# Patient Record
Sex: FEMALE | Race: WHITE | Employment: OTHER | URBAN - METROPOLITAN AREA
[De-identification: names, ages, dates, MRNs, and addresses within clinical notes are randomized per-mention and may not be internally consistent; named-entity substitution may affect disease eponyms.]

---

## 2017-02-17 ENCOUNTER — TELEPHONE (OUTPATIENT)
Dept: GASTROENTEROLOGY | Facility: CLINIC | Age: 77
End: 2017-02-17

## 2017-02-17 NOTE — TELEPHONE ENCOUNTER
Mailed letter to patient notifying him of overdue labs (CBC) Ordered 8-9-16 and due 1-2017. Overdue letter mailed to patient.

## 2017-02-23 ENCOUNTER — APPOINTMENT (OUTPATIENT)
Dept: LAB | Facility: HOSPITAL | Age: 77
End: 2017-02-23
Attending: INTERNAL MEDICINE
Payer: MEDICARE

## 2017-02-23 PROCEDURE — 85025 COMPLETE CBC W/AUTO DIFF WBC: CPT | Performed by: INTERNAL MEDICINE

## 2017-03-03 DIAGNOSIS — D50.0 IRON DEFICIENCY ANEMIA DUE TO CHRONIC BLOOD LOSS: Primary | ICD-10-CM

## 2017-03-28 ENCOUNTER — TELEPHONE (OUTPATIENT)
Dept: GASTROENTEROLOGY | Facility: CLINIC | Age: 77
End: 2017-03-28

## 2017-03-28 NOTE — TELEPHONE ENCOUNTER
Pt is not due for repeat labs at this time. Last labs 2/23/17 due for repeat labs in 3 moths which would be May. LMTCB with any additional questions.

## 2017-03-28 NOTE — TELEPHONE ENCOUNTER
Pt wanted to know if she owed any other labs for GS. States she had last labs done on 2/27/2017 - does she go every 3 mos? Pls call. Thank you.

## 2017-05-23 ENCOUNTER — TELEPHONE (OUTPATIENT)
Dept: GASTROENTEROLOGY | Facility: CLINIC | Age: 77
End: 2017-05-23

## 2017-05-23 NOTE — TELEPHONE ENCOUNTER
Pt.,wants to make sure that orders are entered for her to get her blood work up done. Pt. States that she is due to get her blood work done on 5/27.

## 2017-05-23 NOTE — TELEPHONE ENCOUNTER
Dr. Genna Rodriguez see below. I see future orders for CBC, Ferritin, Iron. Would you like any others placed before pt has blood draw?

## 2017-05-25 ENCOUNTER — TELEPHONE (OUTPATIENT)
Dept: GASTROENTEROLOGY | Facility: CLINIC | Age: 77
End: 2017-05-25

## 2017-05-25 NOTE — TELEPHONE ENCOUNTER
Spoke with pt and reviewed labs that we ordered. She is upset that when she went to her well-visit with PCP she did not check everything. She is asking if GS will order a lipid panel so she can check her cholesterol.   Reviewed I would check with GS and

## 2017-06-13 ENCOUNTER — APPOINTMENT (OUTPATIENT)
Dept: LAB | Facility: HOSPITAL | Age: 77
End: 2017-06-13
Attending: INTERNAL MEDICINE
Payer: MEDICARE

## 2017-06-13 DIAGNOSIS — D50.0 IRON DEFICIENCY ANEMIA DUE TO CHRONIC BLOOD LOSS: ICD-10-CM

## 2017-06-13 PROCEDURE — 83540 ASSAY OF IRON: CPT

## 2017-06-13 PROCEDURE — 36415 COLL VENOUS BLD VENIPUNCTURE: CPT

## 2017-06-13 PROCEDURE — 85025 COMPLETE CBC W/AUTO DIFF WBC: CPT

## 2017-06-13 PROCEDURE — 82728 ASSAY OF FERRITIN: CPT

## 2017-06-15 ENCOUNTER — TELEPHONE (OUTPATIENT)
Dept: GASTROENTEROLOGY | Facility: CLINIC | Age: 77
End: 2017-06-15

## 2017-06-15 DIAGNOSIS — D50.0 IRON DEFICIENCY ANEMIA DUE TO CHRONIC BLOOD LOSS: Primary | ICD-10-CM

## 2017-08-28 RX ORDER — PANTOPRAZOLE SODIUM 40 MG/1
TABLET, DELAYED RELEASE ORAL
Qty: 60 TABLET | Refills: 2 | Status: SHIPPED | OUTPATIENT
Start: 2017-08-28 | End: 2017-12-14

## 2017-08-28 NOTE — TELEPHONE ENCOUNTER
Pending Prescriptions Disp Refills    PANTOPRAZOLE SODIUM 40 MG Oral Tab EC [Pharmacy Med Name: PANTOPRAZOLE 40MG TABLETS] 60 tablet 0     Sig: TAKE 1 TABLET(40 MG) BY MOUTH TWICE DAILY BEFORE MEALS         See refill request  Patient last seen 3-31-16.

## 2017-08-28 NOTE — TELEPHONE ENCOUNTER
Please contact the patient. I have refilled her prescription for 3 months. She should be seen in the office by the end of the year.

## 2017-08-30 ENCOUNTER — TELEPHONE (OUTPATIENT)
Dept: GASTROENTEROLOGY | Facility: CLINIC | Age: 77
End: 2017-08-30

## 2017-08-30 NOTE — TELEPHONE ENCOUNTER
There is no scientific evidence that \"a detox\" or \"colon cleanse\" provides any health benefits. Agree with either a trial of a FODMAP diet or a trial of a probiotic such as Align.

## 2017-08-30 NOTE — TELEPHONE ENCOUNTER
Future Appointments  Date Time Provider Wei Rachel   9/1/2017 2:00 PM Umesh Singleton MD [facility] 9208   12/14/2017 2:00 PM Magui Lopez MD [facility] [place] [facility]     appt made

## 2017-08-30 NOTE — TELEPHONE ENCOUNTER
Pt called to find out when her next labs are due. She also has questions about report she heard on news about bacteria in stomach and melatonin. Please call.

## 2017-08-30 NOTE — TELEPHONE ENCOUNTER
Spoke with pt at length    Watched something online regarding bacteria in lower intestine and wants to \"detox or cleanse her gut. \"    Symptoms lately of looser stools and bloating    I advised usually we do not advise detox or cleanses you can buy OTC.

## 2017-08-31 NOTE — TELEPHONE ENCOUNTER
Pt called me back and below recommendations were reviewed. I faxed her a copy of the low FODMAP diet today.  Reviewed these recommendations can take weeks to notice effect

## 2017-12-13 ENCOUNTER — LAB ENCOUNTER (OUTPATIENT)
Dept: LAB | Facility: HOSPITAL | Age: 77
End: 2017-12-13
Attending: INTERNAL MEDICINE
Payer: MEDICARE

## 2017-12-13 DIAGNOSIS — D50.0 IRON DEFICIENCY ANEMIA DUE TO CHRONIC BLOOD LOSS: ICD-10-CM

## 2017-12-13 PROCEDURE — 85025 COMPLETE CBC W/AUTO DIFF WBC: CPT

## 2017-12-13 PROCEDURE — 82728 ASSAY OF FERRITIN: CPT

## 2017-12-13 PROCEDURE — 36415 COLL VENOUS BLD VENIPUNCTURE: CPT

## 2017-12-13 PROCEDURE — 83540 ASSAY OF IRON: CPT

## 2017-12-14 ENCOUNTER — TELEPHONE (OUTPATIENT)
Dept: GASTROENTEROLOGY | Facility: CLINIC | Age: 77
End: 2017-12-14

## 2017-12-14 ENCOUNTER — OFFICE VISIT (OUTPATIENT)
Dept: GASTROENTEROLOGY | Facility: CLINIC | Age: 77
End: 2017-12-14

## 2017-12-14 VITALS
SYSTOLIC BLOOD PRESSURE: 130 MMHG | DIASTOLIC BLOOD PRESSURE: 84 MMHG | WEIGHT: 176.5 LBS | BODY MASS INDEX: 30.13 KG/M2 | HEIGHT: 64 IN

## 2017-12-14 DIAGNOSIS — K44.9 HIATAL HERNIA: ICD-10-CM

## 2017-12-14 DIAGNOSIS — E61.1 IRON DEFICIENCY: Primary | ICD-10-CM

## 2017-12-14 PROCEDURE — 99213 OFFICE O/P EST LOW 20 MIN: CPT | Performed by: INTERNAL MEDICINE

## 2017-12-14 PROCEDURE — G0463 HOSPITAL OUTPT CLINIC VISIT: HCPCS | Performed by: INTERNAL MEDICINE

## 2017-12-14 RX ORDER — PANTOPRAZOLE SODIUM 40 MG/1
40 TABLET, DELAYED RELEASE ORAL
Qty: 60 TABLET | Refills: 11 | Status: SHIPPED | OUTPATIENT
Start: 2017-12-14 | End: 2018-12-26

## 2017-12-14 NOTE — TELEPHONE ENCOUNTER
I spoke with pt. She states somestimes she takes 2 pills daily and other times she only takes one daily.     She states depends on pain.  -when she takes Tylenol, she'll only take 1 daily  -when her pain is more severe, she will take ibuprofen and then in

## 2017-12-14 NOTE — PATIENT INSTRUCTIONS
1.  Resume iron once daily. 2.  Repeat blood count in 6 months. 3.  Options to regulate the bowel movements: Align 1 daily for 30 days. An alternative would be a fiber supplement such as Benefiber 1 tablespoon daily in 8 ounces of water.   [address]

## 2017-12-18 ENCOUNTER — TELEPHONE (OUTPATIENT)
Dept: GASTROENTEROLOGY | Facility: CLINIC | Age: 77
End: 2017-12-18

## 2017-12-18 RX ORDER — VITAMIN E 200 UNIT
CAPSULE ORAL
COMMUNITY
End: 2019-02-02

## 2017-12-18 NOTE — TELEPHONE ENCOUNTER
Pt requesting a call back to update RN on medications she is currently taking.  Please call thank you [phone]

## 2017-12-18 NOTE — TELEPHONE ENCOUNTER
I phoned pt and reviewed.   Also notes she takes megared omega pills and [facility] ultramega womens dietary supplement

## 2017-12-18 NOTE — TELEPHONE ENCOUNTER
Albuterol/Ventolin by Dr Dayanara Comer.  Never started  Anastrozole - BC stopped off 6 months    Stopped Cymbalta 30 or 60 mg    Ferrous Sulfate [id number]  Correct dosing  She is taking the correct dosing    LMTCB

## 2017-12-29 RX ORDER — PANTOPRAZOLE SODIUM 40 MG/1
TABLET, DELAYED RELEASE ORAL
Qty: 180 TABLET | Refills: 4 | Status: SHIPPED | OUTPATIENT
Start: 2017-12-29 | End: 2019-01-22

## 2017-12-29 NOTE — TELEPHONE ENCOUNTER
See above refill request. Last seen in office 12/14/17. Last refilled 12/14/17 #60 x11 refills to [facility].     Resent with 90 day supply today

## 2018-03-16 NOTE — TELEPHONE ENCOUNTER
Current Outpatient Prescriptions:  PANTOPRAZOLE SODIUM 40 MG Oral Tab EC TAKE 1 TABLET(40 MG) BY MOUTH TWICE DAILY BEFORE MEALS Disp: 180 tablet Rfl: 4

## 2018-03-16 NOTE — TELEPHONE ENCOUNTER
Pending Prescriptions Disp Refills    Pantoprazole Sodium 40 MG Oral Tab EC 60 tablet 11     Sig: Take 1 tablet (40 mg total) by mouth 2 (two) times daily before meals. See refill request  Patient last seen 12-14-17.    Medication last refilled 12

## 2018-03-19 RX ORDER — PANTOPRAZOLE SODIUM 40 MG/1
40 TABLET, DELAYED RELEASE ORAL
Qty: 60 TABLET | Refills: 11 | OUTPATIENT
Start: 2018-03-19

## 2018-03-19 NOTE — TELEPHONE ENCOUNTER
Rx is refilled for 90 days ×1 year during the last office visit. Patient should still have refills. Rx declined.

## 2018-04-20 ENCOUNTER — TELEPHONE (OUTPATIENT)
Dept: GASTROENTEROLOGY | Facility: CLINIC | Age: 78
End: 2018-04-20

## 2018-04-20 NOTE — TELEPHONE ENCOUNTER
Pt is wondering if she is due for labs for dr dang informed pt orders were put in wanted to hear from clinical staff  please call.

## 2018-04-26 NOTE — TELEPHONE ENCOUNTER
Nursing: Typically labs are not related to a gastroenterology issue are ordered by the patient's primary care or the appropriate specialty. I do not see that the lipid panel is related to any of her gastro-issues that she has been seen Dr. Esther Campos.

## 2018-04-26 NOTE — TELEPHONE ENCOUNTER
Left detailed message informing of three labs that are due in June per Dr. Yeimy Gill on 12/14/17. Informed message would be routed to N since Dr. Agus Patton is out of office regarding placing Lipid panel order. Office phone number provided if any further questions.

## 2018-04-26 NOTE — TELEPHONE ENCOUNTER
Pt returning RN call please call back regarding orders. Pt states to please leave a detailed message with orders that the pt must complete and when. Should pt fast for labs. Pt also requesting lab order for cholesterol levels.   Ph. [phone]

## 2018-04-26 NOTE — TELEPHONE ENCOUNTER
Left detailed message relaying Tete EL's message regarding Lipid panel as shown below. Office number provided to pt again if any additional questions at this time. No further questions at this time.

## 2018-05-31 ENCOUNTER — LAB ENCOUNTER (OUTPATIENT)
Dept: LAB | Facility: HOSPITAL | Age: 78
End: 2018-05-31
Attending: INTERNAL MEDICINE
Payer: MEDICARE

## 2018-05-31 DIAGNOSIS — E61.1 IRON DEFICIENCY: ICD-10-CM

## 2018-05-31 PROCEDURE — 36415 COLL VENOUS BLD VENIPUNCTURE: CPT

## 2018-05-31 PROCEDURE — 82728 ASSAY OF FERRITIN: CPT

## 2018-05-31 PROCEDURE — 85025 COMPLETE CBC W/AUTO DIFF WBC: CPT

## 2018-05-31 PROCEDURE — 84466 ASSAY OF TRANSFERRIN: CPT

## 2018-05-31 PROCEDURE — 83540 ASSAY OF IRON: CPT

## 2018-06-01 ENCOUNTER — TELEPHONE (OUTPATIENT)
Dept: GASTROENTEROLOGY | Facility: CLINIC | Age: 78
End: 2018-06-01

## 2018-06-01 NOTE — TELEPHONE ENCOUNTER
Spoke to pt and relayed Dr. Dayami Mallory message as shown below. Pt verbalized understanding appt scheduled for 12/3/18 at 1500. Pt verbalized understanding agreement to appointment scheduled. No further action required at this time.     Notes recorded by S

## 2018-12-26 ENCOUNTER — TELEPHONE (OUTPATIENT)
Dept: GASTROENTEROLOGY | Facility: CLINIC | Age: 78
End: 2018-12-26

## 2018-12-26 DIAGNOSIS — E61.1 IRON DEFICIENCY: Primary | ICD-10-CM

## 2018-12-27 NOTE — TELEPHONE ENCOUNTER
----- Message from Ang Rodriguez MD sent at 6/15/2017  2:45 PM CDT -----  Please inform the patient that her blood count remains normal off the iron. Iron level is normal as well. I would repeat a CBC and iron studies in 6 months.   The patient susie
LMTCB
LMTCB
Pt notified of results and recommendations. Future lab orders placed and pt transferred to scheduling for appt. Verbalizes understanding and denies questions at this time.
no

## 2018-12-27 NOTE — TELEPHONE ENCOUNTER
Requested Prescriptions     Pending Prescriptions Disp Refills   • PANTOPRAZOLE SODIUM 40 MG Oral Tab EC [Pharmacy Med Name: PANTOPRAZOLE 40MG TABLETS] 60 tablet 0     Sig: TAKE 1 TABLET BY MOUTH TWICE DAILY BEFORE A MEAL     LOV 12/14/17  LR 12/29/17    e

## 2018-12-28 ENCOUNTER — TELEPHONE (OUTPATIENT)
Dept: GASTROENTEROLOGY | Facility: CLINIC | Age: 78
End: 2018-12-28

## 2018-12-28 RX ORDER — PANTOPRAZOLE SODIUM 40 MG/1
TABLET, DELAYED RELEASE ORAL
Qty: 60 TABLET | Refills: 2 | Status: SHIPPED | OUTPATIENT
Start: 2018-12-28 | End: 2019-01-22

## 2018-12-28 NOTE — TELEPHONE ENCOUNTER
Pt asked about rx:Pantoprazole,pt advised of message below, pt ryder first available appt 2/4/19, thanks.

## 2018-12-28 NOTE — TELEPHONE ENCOUNTER
Future Appointments   Date Time Provider Wei Ramos   1/10/2019 11:00 AM STEPHEN Christy [facility]- Patient is asking for labs orders to be placed that are due in Dec 2018 per Dr. Agus Patton last lab result note.   -- please sig

## 2018-12-28 NOTE — TELEPHONE ENCOUNTER
GI RN Staff:  I have refilled the patient's Rx x 3 months. She should be seen in the office in follow up for further refills.

## 2018-12-31 NOTE — TELEPHONE ENCOUNTER
Left message RX was sent to her local [facility] to call back if she had further Marlyn Stroud [phone].

## 2019-01-14 NOTE — PROGRESS NOTES
[address] [facility] Follow-up Visit    Natalie EGD/colonoscopy performed by Dr. Allen Suarez with IV twilight related to DARRYN, chronic NSAID use, findings: Left colon diverticulosis, esophageal junction erosions, moderate sized hiatal hernia with Jace's erosions, antral erosions likely NSAID induced,  MG Oral Cap Take by mouth. Disp:  Rfl:    ferrous sulfate 325 (65 FE) MG Oral Tab EC Take 325 mg by mouth daily.    Disp:  Rfl:        Allergies:  No Known Allergies    ROS:   CONSTITUTIONAL:  negative for fevers, chills  EYES:  negative for change well.  GERD symptoms are well managed with Protonix 40 mg daily which I have strongly encouraged her to continue. X 1 year. She may use NSAIDs cautiously at the lowest dose possible and as needed. I would avoid large quantities of NSAIDs.   I reviewed he

## 2019-01-21 ENCOUNTER — APPOINTMENT (OUTPATIENT)
Dept: LAB | Facility: HOSPITAL | Age: 79
End: 2019-01-21
Attending: INTERNAL MEDICINE
Payer: MEDICARE

## 2019-01-21 LAB
BASOPHILS # BLD: 0 K/UL (ref 0–0.2)
BASOPHILS NFR BLD: 1 %
EOSINOPHIL # BLD: 0.1 K/UL (ref 0–0.7)
EOSINOPHIL NFR BLD: 1 %
ERYTHROCYTE [DISTWIDTH] IN BLOOD BY AUTOMATED COUNT: 16.7 % (ref 11–15)
FERRITIN SERPL IA-MCNC: 18 NG/ML (ref 11–307)
HCT VFR BLD AUTO: 42.1 % (ref 35–48)
HGB BLD-MCNC: 13.8 G/DL (ref 12–16)
IRON SATN MFR SERPL: 10 % (ref 15–50)
IRON SERPL-MCNC: 38 MCG/DL (ref 28–170)
LYMPHOCYTES # BLD: 1.3 K/UL (ref 1–4)
LYMPHOCYTES NFR BLD: 24 %
MCH RBC QN AUTO: 29.8 PG (ref 27–32)
MCHC RBC AUTO-ENTMCNC: 32.7 G/DL (ref 32–37)
MCV RBC AUTO: 91.1 FL (ref 80–100)
MONOCYTES # BLD: 0.6 K/UL (ref 0–1)
MONOCYTES NFR BLD: 12 %
NEUTROPHILS # BLD AUTO: 3.3 K/UL (ref 1.8–7.7)
NEUTROPHILS NFR BLD: 62 %
PLATELET # BLD AUTO: 311 K/UL (ref 140–400)
PMV BLD AUTO: 9.1 FL (ref 7.4–10.3)
RBC # BLD AUTO: 4.62 M/UL (ref 3.7–5.4)
TIBC SERPL-MCNC: 383 MCG/DL (ref 228–428)
TRANSFERRIN SERPL-MCNC: 290 MG/DL (ref 192–382)
WBC # BLD AUTO: 5.3 K/UL (ref 4–11)

## 2019-01-21 PROCEDURE — 83540 ASSAY OF IRON: CPT | Performed by: INTERNAL MEDICINE

## 2019-01-21 PROCEDURE — 84466 ASSAY OF TRANSFERRIN: CPT | Performed by: INTERNAL MEDICINE

## 2019-01-21 PROCEDURE — 82728 ASSAY OF FERRITIN: CPT | Performed by: INTERNAL MEDICINE

## 2019-01-21 PROCEDURE — 85025 COMPLETE CBC W/AUTO DIFF WBC: CPT | Performed by: INTERNAL MEDICINE

## 2019-01-21 PROCEDURE — 36415 COLL VENOUS BLD VENIPUNCTURE: CPT | Performed by: INTERNAL MEDICINE

## 2019-01-22 ENCOUNTER — TELEPHONE (OUTPATIENT)
Dept: GASTROENTEROLOGY | Facility: CLINIC | Age: 79
End: 2019-01-22

## 2019-01-22 ENCOUNTER — OFFICE VISIT (OUTPATIENT)
Dept: GASTROENTEROLOGY | Facility: CLINIC | Age: 79
End: 2019-01-22
Payer: MEDICARE

## 2019-01-22 VITALS
SYSTOLIC BLOOD PRESSURE: 144 MMHG | HEART RATE: 73 BPM | WEIGHT: 141.19 LBS | BODY MASS INDEX: 24.1 KG/M2 | HEIGHT: 64 IN | DIASTOLIC BLOOD PRESSURE: 88 MMHG

## 2019-01-22 DIAGNOSIS — D50.0 IRON DEFICIENCY ANEMIA DUE TO CHRONIC BLOOD LOSS: ICD-10-CM

## 2019-01-22 DIAGNOSIS — K44.9 HIATAL HERNIA WITH GERD: Primary | ICD-10-CM

## 2019-01-22 DIAGNOSIS — K21.9 HIATAL HERNIA WITH GERD: Primary | ICD-10-CM

## 2019-01-22 PROCEDURE — 99213 OFFICE O/P EST LOW 20 MIN: CPT | Performed by: NURSE PRACTITIONER

## 2019-01-22 PROCEDURE — G0463 HOSPITAL OUTPT CLINIC VISIT: HCPCS | Performed by: NURSE PRACTITIONER

## 2019-01-22 RX ORDER — PANTOPRAZOLE SODIUM 40 MG/1
40 TABLET, DELAYED RELEASE ORAL
Qty: 30 TABLET | Refills: 11 | Status: ON HOLD | OUTPATIENT
Start: 2019-01-22 | End: 2020-01-20

## 2019-01-22 NOTE — TELEPHONE ENCOUNTER
Notes recorded by STEPHEN Cornell on 1/22/2019 at 8:30 AM CST  Nursing: Please let the patient know that her blood work is consistent with her prior labs 7 months ago with the exception of a lower iron saturation that would indicate iron deficiency b

## 2019-01-22 NOTE — PATIENT INSTRUCTIONS
1. Continue Protonix   2. Start multivitamin with iron supplement  3. Complete lab work in 2 months  4.   Follow-up in 1 year or sooner if new issues arise

## 2019-01-23 ENCOUNTER — TELEPHONE (OUTPATIENT)
Dept: GASTROENTEROLOGY | Facility: CLINIC | Age: 79
End: 2019-01-23

## 2019-01-23 NOTE — TELEPHONE ENCOUNTER
Pt called to find out if this office has her blood type on file. Please call. OK to leave detailed message.

## 2019-01-23 NOTE — TELEPHONE ENCOUNTER
Notified patient via  that we do not have blood type on file as that is a different lab test that needs to be drawn. Also if the patient has any further questions she can call our office at [phone].

## 2019-02-01 ENCOUNTER — HOSPITAL ENCOUNTER (OUTPATIENT)
Dept: GENERAL RADIOLOGY | Facility: HOSPITAL | Age: 79
Discharge: HOME | End: 2019-02-01
Attending: ORTHOPAEDIC SURGERY
Payer: MEDICARE

## 2019-02-01 DIAGNOSIS — S69.92XA INJURY OF LEFT WRIST, INITIAL ENCOUNTER: ICD-10-CM

## 2019-02-01 PROCEDURE — 73110 X-RAY EXAM OF WRIST: CPT | Performed by: ORTHOPAEDIC SURGERY

## 2019-02-02 ENCOUNTER — HOSPITAL ENCOUNTER (OUTPATIENT)
Age: 79
Discharge: HOME | End: 2019-02-02
Attending: FAMILY MEDICINE
Payer: MEDICARE

## 2019-02-02 VITALS
RESPIRATION RATE: 18 BRPM | OXYGEN SATURATION: 100 % | WEIGHT: 141 LBS | DIASTOLIC BLOOD PRESSURE: 70 MMHG | HEART RATE: 74 BPM | TEMPERATURE: 98 F | HEIGHT: 64 IN | SYSTOLIC BLOOD PRESSURE: 127 MMHG | BODY MASS INDEX: 24.07 KG/M2

## 2019-02-02 DIAGNOSIS — M25.532 LEFT WRIST PAIN: Primary | ICD-10-CM

## 2019-02-02 DIAGNOSIS — M67.40 GANGLION CYST: ICD-10-CM

## 2019-02-02 PROCEDURE — 99204 OFFICE O/P NEW MOD 45 MIN: CPT

## 2019-02-02 PROCEDURE — 99213 OFFICE O/P EST LOW 20 MIN: CPT

## 2019-02-02 RX ORDER — ACETAMINOPHEN AND CODEINE PHOSPHATE 300; 30 MG/1; MG/1
1 TABLET ORAL NIGHTLY PRN
Qty: 7 TABLET | Refills: 0 | Status: SHIPPED | OUTPATIENT
Start: 2019-02-02 | End: 2019-02-09

## 2019-02-02 NOTE — ED INITIAL ASSESSMENT (HPI)
Pt presents to the IC with c/o wrist pain s/p a slip and fall. Pt called her ortho, Miguel A Laurent, and had an outpatient xray done yesterday. Resulted in the computer. Pt cannot get a hold of Miguel A Laurent to get her results.

## 2019-02-14 RX ORDER — PANTOPRAZOLE SODIUM 40 MG/1
TABLET, DELAYED RELEASE ORAL
Qty: 180 TABLET | Refills: 2 | Status: SHIPPED | OUTPATIENT
Start: 2019-02-14 | End: 2020-01-04

## 2019-02-14 NOTE — TELEPHONE ENCOUNTER
Requested Prescriptions     Pending Prescriptions Disp Refills   • PANTOPRAZOLE SODIUM 40 MG Oral Tab EC [Pharmacy Med Name: PANTOPRAZOLE 40MG TABLETS] 180 tablet 2     Sig: TAKE 1 TABLET BY MOUTH TWICE DAILY BEFORE A MEAL     LOV-1/22/19  LR-1/22/19

## 2019-04-22 ENCOUNTER — TELEPHONE (OUTPATIENT)
Dept: GASTROENTEROLOGY | Facility: CLINIC | Age: 79
End: 2019-04-22

## 2019-12-13 NOTE — ED PROVIDER NOTES
Patient Seen in: [address]    History   Patient presents with:  Upper Extremity Injury (musculoskeletal)    Stated Complaint: wrist pain     HPI    68yo female with PMHx sig for anemia, anxiety, breast cancer, copd and Patient is requesting referral. Patient has appointment with Dr. Aminata Bucio on 12/16 for follow up on facial rash- 6 weeks. Please advise. reviewed. All other systems reviewed and negative except as noted above.     Physical Exam     ED Triage Vitals [02/02/19 1358]   /70   Pulse 74   Resp 18   Temp 97.9 °F (36.6 °C)   Temp src Oral   SpO2 100 %   O2 Device None (Room air)       Cur trapezium with radial subluxation of the base of the first metatarsal. Degeneration of the first MCP joint. 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     O List    START taking these medications    Acetaminophen-Codeine #3 300-30 MG Oral Tab  Take 1 tablet by mouth nightly as needed for Pain.   Qty: 7 tablet Refills: 0

## 2020-01-04 RX ORDER — VIT A/VIT C/VIT E/ZINC/COPPER 2148-113
TABLET ORAL DAILY
COMMUNITY

## 2020-01-04 RX ORDER — ACETAMINOPHEN 500 MG
1000 TABLET ORAL EVERY 6 HOURS PRN
Status: ON HOLD | COMMUNITY
End: 2020-01-20

## 2020-01-04 RX ORDER — IBUPROFEN 200 MG
200 TABLET ORAL EVERY 8 HOURS PRN
Status: ON HOLD | COMMUNITY
End: 2020-01-20

## 2020-01-04 RX ORDER — LISINOPRIL 10 MG/1
10 TABLET ORAL DAILY
COMMUNITY

## 2020-01-06 ENCOUNTER — TELEPHONE (OUTPATIENT)
Dept: FAMILY MEDICINE CLINIC | Facility: CLINIC | Age: 80
End: 2020-01-06

## 2020-01-06 NOTE — TELEPHONE ENCOUNTER
Surgery on 01/20/20, RTKA with Dr. Aubrie Harris @ [address] [place] [address]    H&P- complete  Labs- MCHC (30.5), RDW-SD (47.8), iron (41), % saturation (11), BUN/Creat ratio (27.4), AST (14), albumin (3.3), A/G ratio (0.8), , Urine Culture shows E.  Coli, all other labs WNL  EKG- WNL

## 2020-01-08 ENCOUNTER — OFFICE VISIT (OUTPATIENT)
Dept: FAMILY MEDICINE CLINIC | Facility: CLINIC | Age: 80
End: 2020-01-08
Payer: MEDICARE

## 2020-01-08 ENCOUNTER — LAB ENCOUNTER (OUTPATIENT)
Dept: LAB | Facility: HOSPITAL | Age: 80
End: 2020-01-08
Attending: FAMILY MEDICINE
Payer: MEDICARE

## 2020-01-08 ENCOUNTER — HOSPITAL ENCOUNTER (OUTPATIENT)
Dept: GENERAL RADIOLOGY | Facility: HOSPITAL | Age: 80
Discharge: HOME | End: 2020-01-08
Attending: FAMILY MEDICINE
Payer: MEDICARE

## 2020-01-08 VITALS
DIASTOLIC BLOOD PRESSURE: 80 MMHG | OXYGEN SATURATION: 97 % | RESPIRATION RATE: 16 BRPM | WEIGHT: 136 LBS | TEMPERATURE: 98 F | HEART RATE: 71 BPM | HEIGHT: 64 IN | BODY MASS INDEX: 23.22 KG/M2 | SYSTOLIC BLOOD PRESSURE: 120 MMHG

## 2020-01-08 DIAGNOSIS — Z01.818 OTHER SPECIFIED PRE-OPERATIVE EXAMINATION: Primary | ICD-10-CM

## 2020-01-08 DIAGNOSIS — M17.10 ARTHRITIS OF KNEE: ICD-10-CM

## 2020-01-08 DIAGNOSIS — K21.9 HIATAL HERNIA WITH GERD: ICD-10-CM

## 2020-01-08 DIAGNOSIS — I10 ESSENTIAL HYPERTENSION: ICD-10-CM

## 2020-01-08 DIAGNOSIS — Z01.812 PRE-OPERATIVE LABORATORY EXAMINATION: ICD-10-CM

## 2020-01-08 DIAGNOSIS — Z01.818 OTHER SPECIFIED PRE-OPERATIVE EXAMINATION: ICD-10-CM

## 2020-01-08 DIAGNOSIS — D50.0 IRON DEFICIENCY ANEMIA DUE TO CHRONIC BLOOD LOSS: ICD-10-CM

## 2020-01-08 DIAGNOSIS — K44.9 HIATAL HERNIA WITH GERD: ICD-10-CM

## 2020-01-08 DIAGNOSIS — K21.00 GERD WITH ESOPHAGITIS: ICD-10-CM

## 2020-01-08 LAB
ALBUMIN SERPL-MCNC: 3.3 G/DL (ref 3.4–5)
ALBUMIN/GLOB SERPL: 0.8 {RATIO} (ref 1–2)
ALP LIVER SERPL-CCNC: 100 U/L (ref 55–142)
ALT SERPL-CCNC: 17 U/L (ref 13–56)
ANION GAP SERPL CALC-SCNC: 2 MMOL/L (ref 0–18)
ANTIBODY SCREEN: NEGATIVE
AST SERPL-CCNC: 14 U/L (ref 15–37)
BASOPHILS # BLD AUTO: 0.04 X10(3) UL (ref 0–0.2)
BASOPHILS NFR BLD AUTO: 0.5 %
BILIRUB SERPL-MCNC: 0.4 MG/DL (ref 0.1–2)
BUN BLD-MCNC: 17 MG/DL (ref 7–18)
BUN/CREAT SERPL: 27.4 (ref 10–20)
CALCIUM BLD-MCNC: 9 MG/DL (ref 8.5–10.1)
CHLORIDE SERPL-SCNC: 107 MMOL/L (ref 98–112)
CO2 SERPL-SCNC: 31 MMOL/L (ref 21–32)
CREAT BLD-MCNC: 0.62 MG/DL (ref 0.55–1.02)
DEPRECATED HBV CORE AB SER IA-ACNC: 30.1 NG/ML (ref 18–340)
DEPRECATED RDW RBC AUTO: 47.8 FL (ref 35.1–46.3)
EOSINOPHIL # BLD AUTO: 0.11 X10(3) UL (ref 0–0.7)
EOSINOPHIL NFR BLD AUTO: 1.4 %
ERYTHROCYTE [DISTWIDTH] IN BLOOD BY AUTOMATED COUNT: 14.1 % (ref 11–15)
GLOBULIN PLAS-MCNC: 4.1 G/DL (ref 2.8–4.4)
GLUCOSE BLD-MCNC: 84 MG/DL (ref 70–99)
HCT VFR BLD AUTO: 44 % (ref 35–48)
HGB BLD-MCNC: 13.4 G/DL (ref 12–16)
IMM GRANULOCYTES # BLD AUTO: 0.03 X10(3) UL (ref 0–1)
IMM GRANULOCYTES NFR BLD: 0.4 %
IRON SATURATION: 11 % (ref 15–50)
IRON SERPL-MCNC: 41 UG/DL (ref 50–170)
LYMPHOCYTES # BLD AUTO: 1.75 X10(3) UL (ref 1–4)
LYMPHOCYTES NFR BLD AUTO: 21.7 %
M PROTEIN MFR SERPL ELPH: 7.4 G/DL (ref 6.4–8.2)
MCH RBC QN AUTO: 28.1 PG (ref 26–34)
MCHC RBC AUTO-ENTMCNC: 30.5 G/DL (ref 31–37)
MCV RBC AUTO: 92.2 FL (ref 80–100)
MONOCYTES # BLD AUTO: 0.85 X10(3) UL (ref 0.1–1)
MONOCYTES NFR BLD AUTO: 10.5 %
NEUTROPHILS # BLD AUTO: 5.28 X10 (3) UL (ref 1.5–7.7)
NEUTROPHILS # BLD AUTO: 5.28 X10(3) UL (ref 1.5–7.7)
NEUTROPHILS NFR BLD AUTO: 65.5 %
OSMOLALITY SERPL CALC.SUM OF ELEC: 291 MOSM/KG (ref 275–295)
PATIENT FASTING Y/N/NP: YES
PLATELET # BLD AUTO: 355 10(3)UL (ref 150–450)
POTASSIUM SERPL-SCNC: 4.2 MMOL/L (ref 3.5–5.1)
RBC # BLD AUTO: 4.77 X10(6)UL (ref 3.8–5.3)
RBC #/AREA URNS AUTO: 1 /HPF
RH BLOOD TYPE: POSITIVE
SODIUM SERPL-SCNC: 140 MMOL/L (ref 136–145)
TOTAL IRON BINDING CAPACITY: 389 UG/DL (ref 240–450)
TRANSFERRIN SERPL-MCNC: 261 MG/DL (ref 200–360)
WBC # BLD AUTO: 8.1 X10(3) UL (ref 4–11)
WBC #/AREA URNS AUTO: 11 /HPF

## 2020-01-08 PROCEDURE — 86901 BLOOD TYPING SEROLOGIC RH(D): CPT

## 2020-01-08 PROCEDURE — 83540 ASSAY OF IRON: CPT

## 2020-01-08 PROCEDURE — 80053 COMPREHEN METABOLIC PANEL: CPT

## 2020-01-08 PROCEDURE — 87081 CULTURE SCREEN ONLY: CPT

## 2020-01-08 PROCEDURE — 36415 COLL VENOUS BLD VENIPUNCTURE: CPT

## 2020-01-08 PROCEDURE — 87088 URINE BACTERIA CULTURE: CPT

## 2020-01-08 PROCEDURE — 82728 ASSAY OF FERRITIN: CPT

## 2020-01-08 PROCEDURE — 86850 RBC ANTIBODY SCREEN: CPT

## 2020-01-08 PROCEDURE — 86900 BLOOD TYPING SEROLOGIC ABO: CPT

## 2020-01-08 PROCEDURE — 87086 URINE CULTURE/COLONY COUNT: CPT

## 2020-01-08 PROCEDURE — 99203 OFFICE O/P NEW LOW 30 MIN: CPT | Performed by: FAMILY MEDICINE

## 2020-01-08 PROCEDURE — 85025 COMPLETE CBC W/AUTO DIFF WBC: CPT

## 2020-01-08 PROCEDURE — 71046 X-RAY EXAM CHEST 2 VIEWS: CPT | Performed by: FAMILY MEDICINE

## 2020-01-08 PROCEDURE — 93010 ELECTROCARDIOGRAM REPORT: CPT | Performed by: FAMILY MEDICINE

## 2020-01-08 PROCEDURE — 84466 ASSAY OF TRANSFERRIN: CPT

## 2020-01-08 PROCEDURE — 81015 MICROSCOPIC EXAM OF URINE: CPT

## 2020-01-08 PROCEDURE — 93005 ELECTROCARDIOGRAM TRACING: CPT

## 2020-01-08 PROCEDURE — 87186 SC STD MICRODIL/AGAR DIL: CPT

## 2020-01-09 PROBLEM — K21.00 GERD WITH ESOPHAGITIS: Status: ACTIVE | Noted: 2020-01-09

## 2020-01-09 PROBLEM — I10 ESSENTIAL HYPERTENSION: Status: ACTIVE | Noted: 2020-01-09

## 2020-01-09 PROBLEM — M17.10 ARTHRITIS OF KNEE: Status: ACTIVE | Noted: 2020-01-09

## 2020-01-10 RX ORDER — CEPHALEXIN 500 MG/1
500 CAPSULE ORAL 3 TIMES DAILY
Qty: 30 CAPSULE | Refills: 0 | Status: ON HOLD | OUTPATIENT
Start: 2020-01-10 | End: 2020-01-20

## 2020-01-10 NOTE — H&P
[facility]    HPI:   I have been consulted by Dr. Aida Benitez to see Talya Lee 78year old female for a preoperative evaluation and medical clearance.  Carol Aguilera has a long history of worsening severe degenerative arthritis diagnostic radiation    • High blood pressure    • History of blood transfusion     no reactions   • HL     mild   • Incontinence    • Osteoarthritis     b/l knees   • Problems with swallowing     upper dentures   • Tobacco abuse, in remission    • Visual violence:        Fear of current or ex partner: Not on file        Emotionally abused: Not on file        Physically abused: Not on file        Forced sexual activity: Not on file    Other Topics      Concerns:        Not on file    Social History Galen Coronado discharge Throat:  No tonsillar erythema or exudate. Mouth:  No oral lesions or ulcerations, good dentition. NECK: Supple, no CLAD, no JVD, no carotid bruit, no thyromegaly. SKIN: No rashes, no skin lesion, no bruising, good turgor.   HEART:  Regular rat or lumbosacral intervertebral disc     Spondylolisthesis at L4-L5 level     Back pain with sciatica, unspecified laterality     Iron deficiency anemia due to chronic blood loss     Hiatal hernia     Arthritis of knee     GERD with esophagitis     Essential

## 2020-01-10 NOTE — TELEPHONE ENCOUNTER
MD Nay Hernández, RN; P [facility] Pre-Op Clinic Clinical Staff             Sensitivities pending. Pls start pt on Keflex 500 mg tid for 10 days if sensitive to cephalexin. Thanks.

## 2020-01-10 NOTE — H&P (VIEW-ONLY)
[facility]    HPI:   I have been consulted by Dr. Jean-Pierre Jeong to see Faye Myersac 78year old female for a preoperative evaluation and medical clearance.  Mynor Lemon has a long history of worsening severe degenerative arthritis diagnostic radiation    • High blood pressure    • History of blood transfusion     no reactions   • HL     mild   • Incontinence    • Osteoarthritis     b/l knees   • Problems with swallowing     upper dentures   • Tobacco abuse, in remission    • Visual violence:        Fear of current or ex partner: Not on file        Emotionally abused: Not on file        Physically abused: Not on file        Forced sexual activity: Not on file    Other Topics      Concerns:        Not on file    Social History Faheme Camejo discharge Throat:  No tonsillar erythema or exudate. Mouth:  No oral lesions or ulcerations, good dentition. NECK: Supple, no CLAD, no JVD, no carotid bruit, no thyromegaly. SKIN: No rashes, no skin lesion, no bruising, good turgor.   HEART:  Regular rat or lumbosacral intervertebral disc     Spondylolisthesis at L4-L5 level     Back pain with sciatica, unspecified laterality     Iron deficiency anemia due to chronic blood loss     Hiatal hernia     Arthritis of knee     GERD with esophagitis     Essential

## 2020-01-11 NOTE — TELEPHONE ENCOUNTER
Dr Mckeon Nails please review . Sensitive to Keflex <=4sensitve. [address] Pao Zapata for surgery? H&P- complete  Labs- MCHC (30.5), RDW-SD (47.8), iron (41), % saturation (11), BUN/Creat ratio (27.4), AST (14), albumin (3.3), A/G ratio (0.8), , Urine Culture shows E.  Coli

## 2020-01-13 NOTE — DISCHARGE SUMMARY
Ortho Discharge Summary     Patient ID:  Charmayne Burgess  [id number]  78year old  9/21/1940      Admit Date: 1/20/2020 10:32 AM  Discharge Date and Time: 1/24/2020  6:43 PM    Attending Physician: Anastacia Alvarez MD     Reason for admission: right knee DJD

## 2020-01-13 NOTE — TELEPHONE ENCOUNTER
Spoke to patient, let her know she is clear for surgery per Dr. Anderson Clements, just make sure she finishes her antibiotic. She agreed.

## 2020-01-17 NOTE — TELEPHONE ENCOUNTER
Spoke with patient concerned with recent surgery in her eye if it will affect her surgery. Per Debby MONTERO from Dr Peter Dias office its [facility]. Informed patient .

## 2020-01-18 RX ORDER — TRAMADOL HYDROCHLORIDE 50 MG/1
50 TABLET ORAL EVERY 6 HOURS PRN
Status: ON HOLD | COMMUNITY
End: 2020-01-24

## 2020-01-18 NOTE — PAT NURSING NOTE
Pt. Called and very anxious , asking for clarifications of surgery instructions. History, meds and  Preop instructions reviewed with pt. Per pt. Request .  Pt. Verbalized understanding and states less anxious now.   Instructed to call surgeon if any quest

## 2020-01-20 ENCOUNTER — HOSPITAL ENCOUNTER (INPATIENT)
Facility: HOSPITAL | Age: 80
LOS: 4 days | Discharge: HOME HEALTH | DRG: 470 | End: 2020-01-24
Attending: ORTHOPAEDIC SURGERY | Admitting: ORTHOPAEDIC SURGERY
Payer: MEDICARE

## 2020-01-20 ENCOUNTER — ANESTHESIA (OUTPATIENT)
Dept: SURGERY | Facility: HOSPITAL | Age: 80
DRG: 470 | End: 2020-01-20
Payer: MEDICARE

## 2020-01-20 ENCOUNTER — ANESTHESIA EVENT (OUTPATIENT)
Dept: SURGERY | Facility: HOSPITAL | Age: 80
DRG: 470 | End: 2020-01-20
Payer: MEDICARE

## 2020-01-20 ENCOUNTER — APPOINTMENT (OUTPATIENT)
Dept: GENERAL RADIOLOGY | Facility: HOSPITAL | Age: 80
DRG: 470 | End: 2020-01-20
Attending: PHYSICIAN ASSISTANT
Payer: MEDICARE

## 2020-01-20 PROBLEM — Z96.659 S/P TOTAL KNEE ARTHROPLASTY: Status: ACTIVE | Noted: 2020-01-20

## 2020-01-20 PROBLEM — Z96.651 STATUS POST RIGHT KNEE REPLACEMENT: Status: ACTIVE | Noted: 2020-01-20

## 2020-01-20 LAB
ALBUMIN SERPL-MCNC: 2.9 G/DL (ref 3.4–5)
ALBUMIN/GLOB SERPL: 0.8 {RATIO} (ref 1–2)
ALP LIVER SERPL-CCNC: 84 U/L (ref 55–142)
ALT SERPL-CCNC: 16 U/L (ref 13–56)
ANION GAP SERPL CALC-SCNC: 4 MMOL/L (ref 0–18)
AST SERPL-CCNC: 20 U/L (ref 15–37)
BILIRUB SERPL-MCNC: 0.4 MG/DL (ref 0.1–2)
BUN BLD-MCNC: 19 MG/DL (ref 7–18)
BUN/CREAT SERPL: 27.5 (ref 10–20)
CALCIUM BLD-MCNC: 8.9 MG/DL (ref 8.5–10.1)
CHLORIDE SERPL-SCNC: 108 MMOL/L (ref 98–112)
CO2 SERPL-SCNC: 27 MMOL/L (ref 21–32)
CREAT BLD-MCNC: 0.69 MG/DL (ref 0.55–1.02)
GLOBULIN PLAS-MCNC: 3.5 G/DL (ref 2.8–4.4)
GLUCOSE BLD-MCNC: 76 MG/DL (ref 70–99)
HCT VFR BLD AUTO: 37.3 % (ref 35–48)
HGB BLD-MCNC: 11.8 G/DL (ref 12–16)
M PROTEIN MFR SERPL ELPH: 6.4 G/DL (ref 6.4–8.2)
OSMOLALITY SERPL CALC.SUM OF ELEC: 289 MOSM/KG (ref 275–295)
POTASSIUM SERPL-SCNC: 4.3 MMOL/L (ref 3.5–5.1)
SODIUM SERPL-SCNC: 139 MMOL/L (ref 136–145)

## 2020-01-20 PROCEDURE — 0SRC0J9 REPLACEMENT OF RIGHT KNEE JOINT WITH SYNTHETIC SUBSTITUTE, CEMENTED, OPEN APPROACH: ICD-10-PCS | Performed by: ORTHOPAEDIC SURGERY

## 2020-01-20 PROCEDURE — 88305 TISSUE EXAM BY PATHOLOGIST: CPT | Performed by: ORTHOPAEDIC SURGERY

## 2020-01-20 PROCEDURE — 73560 X-RAY EXAM OF KNEE 1 OR 2: CPT | Performed by: PHYSICIAN ASSISTANT

## 2020-01-20 PROCEDURE — 85018 HEMOGLOBIN: CPT | Performed by: PHYSICIAN ASSISTANT

## 2020-01-20 PROCEDURE — 80053 COMPREHEN METABOLIC PANEL: CPT | Performed by: PHYSICIAN ASSISTANT

## 2020-01-20 PROCEDURE — 88311 DECALCIFY TISSUE: CPT | Performed by: ORTHOPAEDIC SURGERY

## 2020-01-20 PROCEDURE — 85014 HEMATOCRIT: CPT | Performed by: PHYSICIAN ASSISTANT

## 2020-01-20 DEVICE — IMPLANTABLE DEVICE: Type: IMPLANTABLE DEVICE | Site: KNEE | Status: FUNCTIONAL

## 2020-01-20 DEVICE — COMP PAT DOME 32MM 8MM STRL: Type: IMPLANTABLE DEVICE | Site: KNEE | Status: FUNCTIONAL

## 2020-01-20 DEVICE — COMP FEM 5 STRL KNEE R NPOR: Type: IMPLANTABLE DEVICE | Site: KNEE | Status: FUNCTIONAL

## 2020-01-20 DEVICE — CEM BN NLTX STRL REUSE MED VSC: Type: IMPLANTABLE DEVICE | Site: KNEE | Status: FUNCTIONAL

## 2020-01-20 DEVICE — TKA CAP, PRIMARY W/O STEM EXT: Type: IMPLANTABLE DEVICE | Status: FUNCTIONAL

## 2020-01-20 RX ORDER — MORPHINE SULFATE 4 MG/ML
2 INJECTION, SOLUTION INTRAMUSCULAR; INTRAVENOUS EVERY 10 MIN PRN
Status: DISCONTINUED | OUTPATIENT
Start: 2020-01-20 | End: 2020-01-20 | Stop reason: HOSPADM

## 2020-01-20 RX ORDER — OXYCODONE HYDROCHLORIDE 5 MG/1
5 TABLET ORAL EVERY 4 HOURS PRN
Status: DISCONTINUED | OUTPATIENT
Start: 2020-01-20 | End: 2020-01-24

## 2020-01-20 RX ORDER — LISINOPRIL 10 MG/1
10 TABLET ORAL DAILY
Status: DISCONTINUED | OUTPATIENT
Start: 2020-01-21 | End: 2020-01-24

## 2020-01-20 RX ORDER — OXYCODONE HYDROCHLORIDE 5 MG/1
10 TABLET ORAL EVERY 4 HOURS PRN
Status: DISCONTINUED | OUTPATIENT
Start: 2020-01-20 | End: 2020-01-24

## 2020-01-20 RX ORDER — METOCLOPRAMIDE HYDROCHLORIDE 5 MG/ML
10 INJECTION INTRAMUSCULAR; INTRAVENOUS EVERY 6 HOURS PRN
Status: ACTIVE | OUTPATIENT
Start: 2020-01-20 | End: 2020-01-22

## 2020-01-20 RX ORDER — HYDROMORPHONE HYDROCHLORIDE 1 MG/ML
0.2 INJECTION, SOLUTION INTRAMUSCULAR; INTRAVENOUS; SUBCUTANEOUS EVERY 5 MIN PRN
Status: DISCONTINUED | OUTPATIENT
Start: 2020-01-20 | End: 2020-01-20 | Stop reason: HOSPADM

## 2020-01-20 RX ORDER — POLYETHYLENE GLYCOL 3350 17 G/17G
17 POWDER, FOR SOLUTION ORAL DAILY PRN
Status: DISCONTINUED | OUTPATIENT
Start: 2020-01-20 | End: 2020-01-24

## 2020-01-20 RX ORDER — PANTOPRAZOLE SODIUM 40 MG/1
40 TABLET, DELAYED RELEASE ORAL
Qty: 30 TABLET | Refills: 0 | Status: ON HOLD | OUTPATIENT
Start: 2020-01-20 | End: 2021-06-08

## 2020-01-20 RX ORDER — ASPIRIN 325 MG
325 TABLET ORAL 2 TIMES DAILY
Status: DISCONTINUED | OUTPATIENT
Start: 2020-01-20 | End: 2020-01-24

## 2020-01-20 RX ORDER — SODIUM CHLORIDE, SODIUM LACTATE, POTASSIUM CHLORIDE, CALCIUM CHLORIDE 600; 310; 30; 20 MG/100ML; MG/100ML; MG/100ML; MG/100ML
INJECTION, SOLUTION INTRAVENOUS CONTINUOUS
Status: DISCONTINUED | OUTPATIENT
Start: 2020-01-20 | End: 2020-01-24

## 2020-01-20 RX ORDER — FAMOTIDINE 20 MG/1
20 TABLET ORAL ONCE
Status: DISCONTINUED | OUTPATIENT
Start: 2020-01-20 | End: 2020-01-20 | Stop reason: HOSPADM

## 2020-01-20 RX ORDER — CEFAZOLIN SODIUM/WATER 2 G/20 ML
2 SYRINGE (ML) INTRAVENOUS EVERY 8 HOURS
Status: COMPLETED | OUTPATIENT
Start: 2020-01-20 | End: 2020-01-21

## 2020-01-20 RX ORDER — HYDROMORPHONE HYDROCHLORIDE 1 MG/ML
1.2 INJECTION, SOLUTION INTRAMUSCULAR; INTRAVENOUS; SUBCUTANEOUS EVERY 2 HOUR PRN
Status: DISCONTINUED | OUTPATIENT
Start: 2020-01-20 | End: 2020-01-24

## 2020-01-20 RX ORDER — PROCHLORPERAZINE EDISYLATE 5 MG/ML
10 INJECTION INTRAMUSCULAR; INTRAVENOUS EVERY 6 HOURS PRN
Status: ACTIVE | OUTPATIENT
Start: 2020-01-20 | End: 2020-01-22

## 2020-01-20 RX ORDER — CEFAZOLIN SODIUM/WATER 2 G/20 ML
2 SYRINGE (ML) INTRAVENOUS ONCE
Status: COMPLETED | OUTPATIENT
Start: 2020-01-20 | End: 2020-01-20

## 2020-01-20 RX ORDER — ONDANSETRON 2 MG/ML
4 INJECTION INTRAMUSCULAR; INTRAVENOUS ONCE AS NEEDED
Status: DISCONTINUED | OUTPATIENT
Start: 2020-01-20 | End: 2020-01-20 | Stop reason: HOSPADM

## 2020-01-20 RX ORDER — FAMOTIDINE 20 MG/1
20 TABLET ORAL 2 TIMES DAILY
Status: DISCONTINUED | OUTPATIENT
Start: 2020-01-20 | End: 2020-01-24

## 2020-01-20 RX ORDER — NALOXONE HYDROCHLORIDE 0.4 MG/ML
80 INJECTION, SOLUTION INTRAMUSCULAR; INTRAVENOUS; SUBCUTANEOUS AS NEEDED
Status: DISCONTINUED | OUTPATIENT
Start: 2020-01-20 | End: 2020-01-20 | Stop reason: HOSPADM

## 2020-01-20 RX ORDER — BUPIVACAINE HYDROCHLORIDE 7.5 MG/ML
INJECTION, SOLUTION INTRASPINAL AS NEEDED
Status: DISCONTINUED | OUTPATIENT
Start: 2020-01-20 | End: 2020-01-20 | Stop reason: SURG

## 2020-01-20 RX ORDER — DOCUSATE SODIUM 100 MG/1
100 CAPSULE, LIQUID FILLED ORAL 2 TIMES DAILY
Status: DISCONTINUED | OUTPATIENT
Start: 2020-01-20 | End: 2020-01-24

## 2020-01-20 RX ORDER — BISACODYL 10 MG
10 SUPPOSITORY, RECTAL RECTAL
Status: DISCONTINUED | OUTPATIENT
Start: 2020-01-20 | End: 2020-01-24

## 2020-01-20 RX ORDER — CYCLOBENZAPRINE HCL 5 MG
5 TABLET ORAL EVERY 8 HOURS PRN
Status: DISCONTINUED | OUTPATIENT
Start: 2020-01-20 | End: 2020-01-24

## 2020-01-20 RX ORDER — SCOLOPAMINE TRANSDERMAL SYSTEM 1 MG/1
1 PATCH, EXTENDED RELEASE TRANSDERMAL ONCE
Status: COMPLETED | OUTPATIENT
Start: 2020-01-20 | End: 2020-01-23

## 2020-01-20 RX ORDER — IBUPROFEN 600 MG/1
600 TABLET ORAL EVERY 8 HOURS PRN
Qty: 90 TABLET | Refills: 2 | Status: ON HOLD | OUTPATIENT
Start: 2020-01-20 | End: 2021-06-08

## 2020-01-20 RX ORDER — HYDROMORPHONE HYDROCHLORIDE 1 MG/ML
0.4 INJECTION, SOLUTION INTRAMUSCULAR; INTRAVENOUS; SUBCUTANEOUS EVERY 2 HOUR PRN
Status: DISCONTINUED | OUTPATIENT
Start: 2020-01-20 | End: 2020-01-24

## 2020-01-20 RX ORDER — HYDROMORPHONE HYDROCHLORIDE 1 MG/ML
0.8 INJECTION, SOLUTION INTRAMUSCULAR; INTRAVENOUS; SUBCUTANEOUS EVERY 2 HOUR PRN
Status: DISCONTINUED | OUTPATIENT
Start: 2020-01-20 | End: 2020-01-24

## 2020-01-20 RX ORDER — LIDOCAINE HYDROCHLORIDE 10 MG/ML
INJECTION, SOLUTION EPIDURAL; INFILTRATION; INTRACAUDAL; PERINEURAL AS NEEDED
Status: DISCONTINUED | OUTPATIENT
Start: 2020-01-20 | End: 2020-01-20 | Stop reason: SURG

## 2020-01-20 RX ORDER — SODIUM CHLORIDE, SODIUM LACTATE, POTASSIUM CHLORIDE, CALCIUM CHLORIDE 600; 310; 30; 20 MG/100ML; MG/100ML; MG/100ML; MG/100ML
INJECTION, SOLUTION INTRAVENOUS CONTINUOUS
Status: DISCONTINUED | OUTPATIENT
Start: 2020-01-20 | End: 2020-01-20 | Stop reason: HOSPADM

## 2020-01-20 RX ORDER — ASPIRIN 325 MG
325 TABLET, DELAYED RELEASE (ENTERIC COATED) ORAL 2 TIMES DAILY WITH MEALS
Status: DISCONTINUED | OUTPATIENT
Start: 2020-01-20 | End: 2020-01-20

## 2020-01-20 RX ORDER — ONDANSETRON 2 MG/ML
4 INJECTION INTRAMUSCULAR; INTRAVENOUS EVERY 4 HOURS PRN
Status: ACTIVE | OUTPATIENT
Start: 2020-01-20 | End: 2020-01-22

## 2020-01-20 RX ORDER — PROCHLORPERAZINE EDISYLATE 5 MG/ML
5 INJECTION INTRAMUSCULAR; INTRAVENOUS ONCE AS NEEDED
Status: DISCONTINUED | OUTPATIENT
Start: 2020-01-20 | End: 2020-01-20 | Stop reason: HOSPADM

## 2020-01-20 RX ORDER — HYDROMORPHONE HYDROCHLORIDE 1 MG/ML
0.4 INJECTION, SOLUTION INTRAMUSCULAR; INTRAVENOUS; SUBCUTANEOUS EVERY 5 MIN PRN
Status: DISCONTINUED | OUTPATIENT
Start: 2020-01-20 | End: 2020-01-20 | Stop reason: HOSPADM

## 2020-01-20 RX ORDER — OXYCODONE HCL 10 MG/1
10 TABLET, FILM COATED, EXTENDED RELEASE ORAL ONCE
Status: COMPLETED | OUTPATIENT
Start: 2020-01-20 | End: 2020-01-20

## 2020-01-20 RX ORDER — HYDROMORPHONE HYDROCHLORIDE 1 MG/ML
0.6 INJECTION, SOLUTION INTRAMUSCULAR; INTRAVENOUS; SUBCUTANEOUS EVERY 5 MIN PRN
Status: DISCONTINUED | OUTPATIENT
Start: 2020-01-20 | End: 2020-01-20 | Stop reason: HOSPADM

## 2020-01-20 RX ORDER — TRANEXAMIC ACID 10 MG/ML
1000 INJECTION, SOLUTION INTRAVENOUS ONCE
Status: COMPLETED | OUTPATIENT
Start: 2020-01-20 | End: 2020-01-20

## 2020-01-20 RX ORDER — DIPHENHYDRAMINE HYDROCHLORIDE 50 MG/ML
12.5 INJECTION INTRAMUSCULAR; INTRAVENOUS EVERY 4 HOURS PRN
Status: DISCONTINUED | OUTPATIENT
Start: 2020-01-20 | End: 2020-01-24

## 2020-01-20 RX ORDER — METOCLOPRAMIDE 10 MG/1
10 TABLET ORAL ONCE
Status: DISCONTINUED | OUTPATIENT
Start: 2020-01-20 | End: 2020-01-20 | Stop reason: HOSPADM

## 2020-01-20 RX ORDER — HYDROCODONE BITARTRATE AND ACETAMINOPHEN 5; 325 MG/1; MG/1
2 TABLET ORAL AS NEEDED
Status: DISCONTINUED | OUTPATIENT
Start: 2020-01-20 | End: 2020-01-20 | Stop reason: HOSPADM

## 2020-01-20 RX ORDER — FAMOTIDINE 10 MG/ML
20 INJECTION, SOLUTION INTRAVENOUS 2 TIMES DAILY
Status: DISCONTINUED | OUTPATIENT
Start: 2020-01-20 | End: 2020-01-24

## 2020-01-20 RX ORDER — ONDANSETRON 4 MG/1
4 TABLET, FILM COATED ORAL EVERY 8 HOURS PRN
Qty: 20 TABLET | Refills: 0 | Status: SHIPPED | OUTPATIENT
Start: 2020-01-20

## 2020-01-20 RX ORDER — DIPHENHYDRAMINE HYDROCHLORIDE 50 MG/ML
25 INJECTION INTRAMUSCULAR; INTRAVENOUS ONCE AS NEEDED
Status: ACTIVE | OUTPATIENT
Start: 2020-01-20 | End: 2020-01-20

## 2020-01-20 RX ORDER — SODIUM CHLORIDE 0.9 % (FLUSH) 0.9 %
10 SYRINGE (ML) INJECTION AS NEEDED
Status: DISCONTINUED | OUTPATIENT
Start: 2020-01-20 | End: 2020-01-24

## 2020-01-20 RX ORDER — TRANEXAMIC ACID 10 MG/ML
INJECTION, SOLUTION INTRAVENOUS AS NEEDED
Status: DISCONTINUED | OUTPATIENT
Start: 2020-01-20 | End: 2020-01-20 | Stop reason: SURG

## 2020-01-20 RX ORDER — CELECOXIB 200 MG/1
200 CAPSULE ORAL ONCE
Status: COMPLETED | OUTPATIENT
Start: 2020-01-20 | End: 2020-01-20

## 2020-01-20 RX ORDER — ACETAMINOPHEN 325 MG/1
650 TABLET ORAL EVERY 6 HOURS SCHEDULED
Status: DISCONTINUED | OUTPATIENT
Start: 2020-01-20 | End: 2020-01-24

## 2020-01-20 RX ORDER — MORPHINE SULFATE 4 MG/ML
4 INJECTION, SOLUTION INTRAMUSCULAR; INTRAVENOUS EVERY 10 MIN PRN
Status: DISCONTINUED | OUTPATIENT
Start: 2020-01-20 | End: 2020-01-20 | Stop reason: HOSPADM

## 2020-01-20 RX ORDER — MORPHINE SULFATE 10 MG/ML
6 INJECTION, SOLUTION INTRAMUSCULAR; INTRAVENOUS EVERY 10 MIN PRN
Status: DISCONTINUED | OUTPATIENT
Start: 2020-01-20 | End: 2020-01-20 | Stop reason: HOSPADM

## 2020-01-20 RX ORDER — OXYCODONE HYDROCHLORIDE 5 MG/1
TABLET ORAL EVERY 4 HOURS PRN
Qty: 60 TABLET | Refills: 0 | Status: SHIPPED | OUTPATIENT
Start: 2020-01-20 | End: 2020-01-23

## 2020-01-20 RX ORDER — LIDOCAINE HYDROCHLORIDE 10 MG/ML
INJECTION, SOLUTION EPIDURAL; INFILTRATION; INTRACAUDAL; PERINEURAL AS NEEDED
Status: DISCONTINUED | OUTPATIENT
Start: 2020-01-20 | End: 2020-01-20

## 2020-01-20 RX ORDER — DIPHENHYDRAMINE HCL 25 MG
25 CAPSULE ORAL EVERY 4 HOURS PRN
Status: DISCONTINUED | OUTPATIENT
Start: 2020-01-20 | End: 2020-01-24

## 2020-01-20 RX ORDER — HYDROCODONE BITARTRATE AND ACETAMINOPHEN 5; 325 MG/1; MG/1
1 TABLET ORAL AS NEEDED
Status: DISCONTINUED | OUTPATIENT
Start: 2020-01-20 | End: 2020-01-20 | Stop reason: HOSPADM

## 2020-01-20 RX ORDER — SODIUM PHOSPHATE, DIBASIC AND SODIUM PHOSPHATE, MONOBASIC 7; 19 G/133ML; G/133ML
1 ENEMA RECTAL ONCE AS NEEDED
Status: DISCONTINUED | OUTPATIENT
Start: 2020-01-20 | End: 2020-01-24

## 2020-01-20 RX ORDER — DOCUSATE SODIUM 100 MG/1
100 CAPSULE, LIQUID FILLED ORAL 2 TIMES DAILY
Qty: 60 CAPSULE | Refills: 2 | Status: SHIPPED | OUTPATIENT
Start: 2020-01-20

## 2020-01-20 RX ORDER — ASPIRIN 325 MG
325 TABLET, DELAYED RELEASE (ENTERIC COATED) ORAL 2 TIMES DAILY
Qty: 60 TABLET | Refills: 0 | Status: SHIPPED | OUTPATIENT
Start: 2020-01-20 | End: 2020-02-19

## 2020-01-20 RX ORDER — ACETAMINOPHEN 500 MG
1000 TABLET ORAL ONCE
Status: DISCONTINUED | OUTPATIENT
Start: 2020-01-20 | End: 2020-01-20 | Stop reason: HOSPADM

## 2020-01-20 RX ORDER — HALOPERIDOL 5 MG/ML
0.25 INJECTION INTRAMUSCULAR ONCE AS NEEDED
Status: DISCONTINUED | OUTPATIENT
Start: 2020-01-20 | End: 2020-01-20 | Stop reason: HOSPADM

## 2020-01-20 RX ADMIN — SODIUM CHLORIDE, SODIUM LACTATE, POTASSIUM CHLORIDE, CALCIUM CHLORIDE: 600; 310; 30; 20 INJECTION, SOLUTION INTRAVENOUS at 15:30:00

## 2020-01-20 RX ADMIN — CEFAZOLIN SODIUM/WATER 2 G: 2 G/20 ML SYRINGE (ML) INTRAVENOUS at 13:20:00

## 2020-01-20 RX ADMIN — TRANEXAMIC ACID 1000 MG: 10 INJECTION, SOLUTION INTRAVENOUS at 13:26:00

## 2020-01-20 RX ADMIN — BUPIVACAINE HYDROCHLORIDE 1.5 ML: 7.5 INJECTION, SOLUTION INTRASPINAL at 13:12:00

## 2020-01-20 RX ADMIN — LIDOCAINE HYDROCHLORIDE 25 MG: 10 INJECTION, SOLUTION EPIDURAL; INFILTRATION; INTRACAUDAL; PERINEURAL at 13:06:00

## 2020-01-20 NOTE — ANESTHESIA PROCEDURE NOTES
Spinal Block  Performed by: Dipak Etienne., CRNA  Authorized by: Carine Obando MD       General Information and Staff    Start Time:  1/20/2020 1:06 PM  End Time:  1/20/2020 1:12 PM  Anesthesiologist:  Carine Obando MD  CRNA:  Dipak Etienne

## 2020-01-20 NOTE — ANESTHESIA PREPROCEDURE EVALUATION
Anesthesia PreOp Note    HPI:     Yulisa West is a 78year old female who presents for preoperative consultation requested by: Willian Gallardo MD    Date of Surgery: 1/20/2020    Procedure(s):  KNEE TOTAL REPLACEMENT  Indication: right knee degenerative KNEE REPLACEMENT SURGERY Left 2002   • LUMPECTOMY RIGHT     • OTHER SURGICAL HISTORY      procedure to check arteries   • TONSILLECTOMY     • TOTAL KNEE REPLACEMENT      approx 2005.  L side       traMADol HCl 50 MG Oral Tab, Take 50 mg by mouth every 6 (si (TRANSDERM-SCOP) patch, 1 patch, Transdermal, Once, Odalys Irene MD, 1 patch at 01/20/20 1055  clonidine/epinephrine/ropivacaine/ketorolac (CERTS) pain cocktail, , Intra-articular, Once (Intra-Op), Odalys Irene MD    No current Epic-ordered outpatient file        Physically abused: Not on file        Forced sexual activity: Not on file    Other Topics      Concerns:        Not on file    Social History Narrative      Live w       Work [employer], [employer]      Available pre-op labs revie All of the patient's questions were answered to the best of my ability. The patient desires the anesthetic management as planned.   Veronica Hart  1/20/2020 11:10 AM

## 2020-01-20 NOTE — ANESTHESIA POSTPROCEDURE EVALUATION
Patient: [facility],[address]    Procedure Summary     Date:  01/20/20 Room / Location:  [facility] OR 05 / [facility] OR    Anesthesia Start:  6857 Anesthesia Stop:  7468    Procedure:  KNEE TOTAL REPLACEMENT (Right Knee) Diagnosis:  (right knee degenerative joint

## 2020-01-20 NOTE — INTERVAL H&P NOTE
Pre-op Diagnosis: right knee degenerative joint disease    The above referenced H&P was reviewed by Domenica Crouch MD on 1/20/2020, the patient was examined and no significant changes have occurred in the patient's condition since the H&P was performed.   I di

## 2020-01-20 NOTE — OR PREOP
[address] [facility],[address] Patient Status:  Surgery Admit Daryle Barley    1940 MRN [id number]   Location [place] [address] Attending Ciera Torres MD   Hosp Day # 0 PCP SIDNEY PHAM     Patient is unable to remove jewelry from right ring finger.   Genette Loveless

## 2020-01-21 LAB
ALBUMIN SERPL-MCNC: 2.9 G/DL (ref 3.4–5)
ALBUMIN/GLOB SERPL: 0.8 {RATIO} (ref 1–2)
ALP LIVER SERPL-CCNC: 82 U/L (ref 55–142)
ALT SERPL-CCNC: 16 U/L (ref 13–56)
ANION GAP SERPL CALC-SCNC: 3 MMOL/L (ref 0–18)
AST SERPL-CCNC: 17 U/L (ref 15–37)
BILIRUB SERPL-MCNC: 0.6 MG/DL (ref 0.1–2)
BUN BLD-MCNC: 16 MG/DL (ref 7–18)
BUN/CREAT SERPL: 25 (ref 10–20)
CALCIUM BLD-MCNC: 8.3 MG/DL (ref 8.5–10.1)
CHLORIDE SERPL-SCNC: 110 MMOL/L (ref 98–112)
CO2 SERPL-SCNC: 29 MMOL/L (ref 21–32)
CREAT BLD-MCNC: 0.64 MG/DL (ref 0.55–1.02)
DEPRECATED RDW RBC AUTO: 49.7 FL (ref 35.1–46.3)
ERYTHROCYTE [DISTWIDTH] IN BLOOD BY AUTOMATED COUNT: 14.9 % (ref 11–15)
GLOBULIN PLAS-MCNC: 3.5 G/DL (ref 2.8–4.4)
GLUCOSE BLD-MCNC: 88 MG/DL (ref 70–99)
HCT VFR BLD AUTO: 36.2 % (ref 35–48)
HGB BLD-MCNC: 11.4 G/DL (ref 12–16)
M PROTEIN MFR SERPL ELPH: 6.4 G/DL (ref 6.4–8.2)
MCH RBC QN AUTO: 29.1 PG (ref 26–34)
MCHC RBC AUTO-ENTMCNC: 31.5 G/DL (ref 31–37)
MCV RBC AUTO: 92.3 FL (ref 80–100)
OSMOLALITY SERPL CALC.SUM OF ELEC: 295 MOSM/KG (ref 275–295)
PLATELET # BLD AUTO: 280 10(3)UL (ref 150–450)
POTASSIUM SERPL-SCNC: 4.5 MMOL/L (ref 3.5–5.1)
RBC # BLD AUTO: 3.92 X10(6)UL (ref 3.8–5.3)
SODIUM SERPL-SCNC: 142 MMOL/L (ref 136–145)
WBC # BLD AUTO: 8.8 X10(3) UL (ref 4–11)

## 2020-01-21 PROCEDURE — 85027 COMPLETE CBC AUTOMATED: CPT | Performed by: FAMILY MEDICINE

## 2020-01-21 PROCEDURE — 97162 PT EVAL MOD COMPLEX 30 MIN: CPT

## 2020-01-21 PROCEDURE — 97110 THERAPEUTIC EXERCISES: CPT

## 2020-01-21 PROCEDURE — 80053 COMPREHEN METABOLIC PANEL: CPT | Performed by: FAMILY MEDICINE

## 2020-01-21 PROCEDURE — 97530 THERAPEUTIC ACTIVITIES: CPT

## 2020-01-21 PROCEDURE — 97116 GAIT TRAINING THERAPY: CPT

## 2020-01-21 RX ORDER — CELECOXIB 100 MG/1
100 CAPSULE ORAL 2 TIMES DAILY
Status: DISCONTINUED | OUTPATIENT
Start: 2020-01-21 | End: 2020-01-24

## 2020-01-21 RX ORDER — TRAMADOL HYDROCHLORIDE 50 MG/1
50 TABLET ORAL EVERY 6 HOURS PRN
Status: DISCONTINUED | OUTPATIENT
Start: 2020-01-21 | End: 2020-01-24

## 2020-01-21 RX ORDER — ALPRAZOLAM 0.25 MG/1
0.5 TABLET ORAL EVERY 6 HOURS PRN
Status: DISCONTINUED | OUTPATIENT
Start: 2020-01-21 | End: 2020-01-24

## 2020-01-21 RX ORDER — HALOPERIDOL 1 MG/1
1 TABLET ORAL EVERY 12 HOURS PRN
Status: DISCONTINUED | OUTPATIENT
Start: 2020-01-21 | End: 2020-01-24

## 2020-01-21 RX ORDER — ALPRAZOLAM 0.25 MG/1
0.25 TABLET ORAL EVERY 6 HOURS PRN
Status: DISCONTINUED | OUTPATIENT
Start: 2020-01-21 | End: 2020-01-24

## 2020-01-21 NOTE — PROGRESS NOTES
[facility]    Progress Note    Charmayne Burgess Patient Status:  Inpatient    1940 MRN [id number]   Location [facility] 4W/SW/SE Attending Anastacia Alvarez MD   Hosp Day # 0 PCP SIDNEY PHAM       Subjective:   Charmayne Burgess is replacement  Stable and doing well.  Aspirin for anticoagulation       COPD (chronic obstructive pulmonary disease) (HCC)        Iron deficiency anemia due to chronic blood loss        GERD with esophagitis        Essential hypertension        Hiatal hernia

## 2020-01-21 NOTE — OPERATIVE REPORT
[address] [place] [address] Jaqueline Sibley S  OPERATIVE REPORT  PATIENT NAME: Rell Whitfield  MR#: [id number]  PMD: Elisa Boyce  DATE OF PROCEDURE: 1/20/2020  PREOPERATIVE DIAGNOSIS: Degenerative Arthritis right knee  POSTOPE the same day surgery program on Monday, January 20, 2020. Following proper identification in the preoperative holding area with confirmation of the above-stated procedure, the patient was brought to the main operating room suite. Procedure was confirmed.  Katlyn Laguna appropriate. The rotation was based between the medial and middle third of the tibial tubercle. This was provisionally pinned. A size 5 right EMPOWR 3D Non-Porous Femur femoral component was placed along with a 16 mm polyethylene spacer.  With this in place

## 2020-01-21 NOTE — PLAN OF CARE
Problem: Patient/Family Goals  Goal: Patient/Family Long Term Goal  Description  Patient's Long Term Goal: To have my pain controlled     Interventions:  - try non pharmacological interventions   - See additional Care Plan goals for specific intervention retention  Description  INTERVENTIONS:  - Assess patient’s ability to void and empty bladder  - Monitor intake/output and perform bladder scan as needed  - Follow urinary retention protocol/standard of care  - Consider collaborating with pharmacy to review relaxation techniques  - Monitor for opioid side effects  - Notify MD/LIP if interventions unsuccessful or patient reports new pain  - Anticipate increased pain with activity and pre-medicate as appropriate  Outcome: Progressing     Problem: RISK FOR INFEC cognitive ability or social support system  Outcome: Progressing   Patient is alert and oriented x4. Transferred from PACU. Patient is a check void. Tolerating general diet well. No bp on right arm. Patient has IVF running.  Patient denies pain at this time

## 2020-01-21 NOTE — PROGRESS NOTES
[place] [facility] - [facility]    Progress Note    [address] [facility],[address] Patient Status:  Inpatient    1940 MRN [id number]   Location [facility] 4W/SW/SE Attending Donya Stapleton MD   Hosp Day # 1 PCP SIDNEY PHAM       Subjective:   [address] [facility],[address] is

## 2020-01-21 NOTE — PROGRESS NOTES
[place] [facility] - [facility]    Ortho Medical Progress Note     [address] [facility],[address] Patient Status:  Inpatient    1940 MRN [id number]   Location [facility] 4W/SW/SE Attending Laya Santana, [address] [facility] Day # 1 PCP SIDNEY PHAM       Subjective:   Srini Sanchez HCT 36.2 01/21/2020    .0 01/21/2020    CREATSERUM 0.64 01/21/2020    BUN 16 01/21/2020     01/21/2020    K 4.5 01/21/2020     01/21/2020    CO2 29.0 01/21/2020    GLU 88 01/21/2020    CA 8.3 (L) 01/21/2020    ALB 2.9 (L) 01/21/2020

## 2020-01-21 NOTE — CM/SW NOTE
SW received MDO for home health. SW met with pt to complete an initial assessment. SW confirmed pt's address and phone number with the pt. Pt states she will be staying with her Dtr, Kostas Manzo at [address].  Pt normally lives a

## 2020-01-21 NOTE — PHYSICAL THERAPY NOTE
PHYSICAL THERAPY KNEE EVALUATION - INPATIENT       Room Number: 432/432-A  Evaluation Date: 1/21/2020  Type of Evaluation: Initial  Physician Order: PT Eval and Treat    Presenting Problem: R TKA  Reason for Therapy: Mobility Dysfunction and Discharge Plan sitting in recliner chair w/ daughter present in room. The patient's Approx Degree of Impairment: 46.58% has been calculated based on documentation in the [facility] '6 clicks' Inpatient Basic Mobility Short Form.   Research supports that patients with [facility] disorder     Anemia   • Breast cancer ([facility]     R lumpectomy   • Cataract    • COPD (chronic obstructive pulmonary disease) ([facility]     pt. denies   • Esophageal reflux     pt. denies   • Essential hypertension    • Exposure to medical diagnostic radiation techniques;Relaxation;Repositioning    COGNITION  · ANXIOUS      BALANCE  Static Sitting: Fair -  Dynamic Sitting: Fair -  Static [address] -  Dynamic Standing: Fair - assistance level: modified independent     Goal #2  Current Status    Goal #3 Patient is able to ambulate 300 feet with assistive device at assistance level: modified independent    Goal #3   Current Status    Goal #4 Patient will negotiate 4 stairs/one cu

## 2020-01-21 NOTE — PLAN OF CARE
Oxy, tylenol, and flexeril not adequately controlling pain, celebrex and tramadol added.  Despite multiple pain medications pt still stating she has 10/10 pain and is frequently in tears, seemingly caused by significant anxiety r/t pain, this RN spoke with oxygenation  Description  INTERVENTIONS:  - Assess for changes in respiratory status  - Assess for changes in mentation and behavior  - Position to facilitate oxygenation and minimize respiratory effort  - Oxygen supplementation based on oxygen saturation membranes remain intact  Description  INTERVENTIONS  - Assess oral mucosa and hygiene practices  - Implement preventative oral hygiene regimen  - Implement oral medicated treatments as ordered  Outcome: Progressing     Problem: PAIN - ADULT  Goal: Papa Snider resources  Description  INTERVENTIONS:  - Identify barriers to discharge w/pt and caregiver  - Include patient/family/discharge partner in discharge planning  - Arrange for needed discharge resources and transportation as appropriate  - Identify discharge

## 2020-01-21 NOTE — PLAN OF CARE
VSS, no acute events overnight. Pt resting in bed. Mild anxiety at beginning of shift, remedied with discussion. Pt up x1a with RW. Disease process discussed with pt. Bed in lowest position, call light and personal possessions within reach.  Plan to continu applicable  - Encourage broncho-pulmonary hygiene including cough, deep breathe, Incentive Spirometry  - Assess the need for suctioning and perform as needed  - Assess and instruct to report SOB or any respiratory difficulty  - Respiratory Therapy support pain goal  Description  INTERVENTIONS:  - Encourage pt to monitor pain and request assistance  - Assess pain using appropriate pain scale  - Administer analgesics based on type and severity of pain and evaluate response  - Implement non-pharmacological hui interpreters to assist at discharge as needed  - Consider post-discharge preferences of patient/family/discharge partner  - Complete POLST form as appropriate  - Assess patient's ability to be responsible for managing their own health  - Refer to [facility] FOR REHAB MEDICINE

## 2020-01-22 PROBLEM — R41.0 CONFUSION: Status: ACTIVE | Noted: 2020-01-22

## 2020-01-22 LAB
ANION GAP SERPL CALC-SCNC: 5 MMOL/L (ref 0–18)
BUN BLD-MCNC: 11 MG/DL (ref 7–18)
BUN/CREAT SERPL: 19.3 (ref 10–20)
CALCIUM BLD-MCNC: 8.3 MG/DL (ref 8.5–10.1)
CHLORIDE SERPL-SCNC: 106 MMOL/L (ref 98–112)
CO2 SERPL-SCNC: 26 MMOL/L (ref 21–32)
CREAT BLD-MCNC: 0.57 MG/DL (ref 0.55–1.02)
GLUCOSE BLD-MCNC: 112 MG/DL (ref 70–99)
HCT VFR BLD AUTO: 36.9 % (ref 35–48)
HGB BLD-MCNC: 11.6 G/DL (ref 12–16)
OSMOLALITY SERPL CALC.SUM OF ELEC: 284 MOSM/KG (ref 275–295)
POTASSIUM SERPL-SCNC: 4 MMOL/L (ref 3.5–5.1)
SODIUM SERPL-SCNC: 137 MMOL/L (ref 136–145)

## 2020-01-22 PROCEDURE — 80048 BASIC METABOLIC PNL TOTAL CA: CPT | Performed by: NURSE PRACTITIONER

## 2020-01-22 PROCEDURE — 85018 HEMOGLOBIN: CPT | Performed by: NURSE PRACTITIONER

## 2020-01-22 PROCEDURE — 85014 HEMATOCRIT: CPT | Performed by: NURSE PRACTITIONER

## 2020-01-22 PROCEDURE — 97116 GAIT TRAINING THERAPY: CPT

## 2020-01-22 PROCEDURE — 97530 THERAPEUTIC ACTIVITIES: CPT

## 2020-01-22 RX ORDER — CELECOXIB 100 MG/1
100 CAPSULE ORAL 2 TIMES DAILY PRN
Qty: 30 CAPSULE | Refills: 1 | Status: SHIPPED | OUTPATIENT
Start: 2020-01-22 | End: 2020-01-24

## 2020-01-22 NOTE — PHYSICAL THERAPY NOTE
PHYSICAL THERAPY KNEE TREATMENT NOTE - INPATIENT     Room Number: 432/432-A             Presenting Problem: R TKA    Problem List  Principal Problem:    Arthritis of knee  Active Problems:    COPD (chronic obstructive pulmonary disease) (HCC)    Iron defic was in the bathroom. The patient's Approx Degree of Impairment: 46.58% has been calculated based on documentation in the [facility] '6 clicks' Inpatient Basic Mobility Short Form.   Research supports that patients with this level of impairment may benefit fro bed?: A Little   How much help from another person does the patient currently need. ..   -   Moving to and from a bed to a chair (including a wheelchair)?: A Little   -   Need to walk in hospital room?: A Little   -   Climbing 3-5 steps with a railing?: A L

## 2020-01-22 NOTE — PROGRESS NOTES
[place] [facility]    Progress Note    [address] [facility],[address] Patient Status:  Inpatient    1940 MRN [id number]   Location [facility] 4W/SW/SE Attending Shay Mo MD   Hosp Day # 2 PCP SIDNEY PHAM       Subjective:   [address] [facility],[address] is Assessment and Plan:     Arthritis of knee  Now replaced      Status post right knee replacement  Stable and doing well. Aspirin for anticoagulation       Confusion: probably combination of meds, sundowning. Improved today.  Needs observation one

## 2020-01-22 NOTE — PLAN OF CARE
VSS. Pt intermittently confused and extremely anxious. Orders for Xanax 0.25-0.5 Q6 PRN and Haldol 1mg Q12 PRN obtained from [address] [place] [address] MD. Spiritual care consulted to discuss and pray with pt. Frequent reorientation, discussion needed. Pt on bed alarm zone 2. facilitate oxygenation and minimize respiratory effort  - Oxygen supplementation based on oxygen saturation or ABGs  - Provide Smoking Cessation handout, if applicable  - Encourage broncho-pulmonary hygiene including cough, deep breathe, Incentive Spiromet oral medicated treatments as ordered  Outcome: Progressing     Problem: PAIN - ADULT  Goal: Verbalizes/displays adequate comfort level or patient's stated pain goal  Description  INTERVENTIONS:  - Encourage pt to monitor pain and request assistance  - Asse Arrange for needed discharge resources and transportation as appropriate  - Identify discharge learning needs (meds, wound care, etc)  - Arrange for interpreters to assist at discharge as needed  - Consider post-discharge preferences of patient/family/disc

## 2020-01-23 PROCEDURE — 97110 THERAPEUTIC EXERCISES: CPT

## 2020-01-23 PROCEDURE — 97116 GAIT TRAINING THERAPY: CPT

## 2020-01-23 PROCEDURE — 97530 THERAPEUTIC ACTIVITIES: CPT

## 2020-01-23 RX ORDER — CYCLOBENZAPRINE HCL 5 MG
5 TABLET ORAL EVERY 8 HOURS PRN
Qty: 45 TABLET | Refills: 1 | Status: SHIPPED | OUTPATIENT
Start: 2020-01-23

## 2020-01-23 RX ORDER — ALPRAZOLAM 0.25 MG/1
0.25 TABLET ORAL EVERY 6 HOURS PRN
Qty: 30 TABLET | Refills: 1 | Status: SHIPPED | OUTPATIENT
Start: 2020-01-23

## 2020-01-23 NOTE — PLAN OF CARE
Pt is POD #2-3. Vital signs within normal limits. PRN Tramadol and Flexeril provided for pain along with scheduled Tylenol. Pt still complains of severe pain. Cries out at times. Extremely anxious; improved with distraction/conversation.  Pt does fall aslee oxygenation  Description  INTERVENTIONS:  - Assess for changes in respiratory status  - Assess for changes in mentation and behavior  - Position to facilitate oxygenation and minimize respiratory effort  - Oxygen supplementation based on oxygen saturation membranes remain intact  Description  INTERVENTIONS  - Assess oral mucosa and hygiene practices  - Implement preventative oral hygiene regimen  - Implement oral medicated treatments as ordered  Outcome: Progressing     Problem: PAIN - ADULT  Goal: Fernando Demarco resources  Description  INTERVENTIONS:  - Identify barriers to discharge w/pt and caregiver  - Include patient/family/discharge partner in discharge planning  - Arrange for needed discharge resources and transportation as appropriate  - Identify discharge

## 2020-01-23 NOTE — PLAN OF CARE
Vu Viera is pod 2 r tkr. Cms intact. Minimal appetite-denies n/v, passing flatus and voiding without difficulty. Tearful and anxious at times regarding discharge home-bedside emotional support provided-xanax prn ordered-see emar.  Ultram/celebrex/tylenol xr/p behavior  - Position to facilitate oxygenation and minimize respiratory effort  - Oxygen supplementation based on oxygen saturation or ABGs  - Provide Smoking Cessation handout, if applicable  - Encourage broncho-pulmonary hygiene including cough, deep hamida hygiene regimen  - Implement oral medicated treatments as ordered  Outcome: Progressing     Problem: PAIN - ADULT  Goal: Verbalizes/displays adequate comfort level or patient's stated pain goal  Description  INTERVENTIONS:  - Encourage pt to monitor pain a

## 2020-01-23 NOTE — CM/SW NOTE
CM made aware by nursing for pt's plan to dc home today with [facility]. Saw pt and daughter at bedside to give them IM. Pt began to express that she would really like to go to Rehab vs home health.   PT's states surgeon strongly wishes for pt to go home with YAZMIN LOVING BSN  Nurse   [phone]

## 2020-01-23 NOTE — PROGRESS NOTES
[facility]    Ortho Medical Progress Note     [address] [facility],[address] Patient Status:  Inpatient    1940 MRN [id number]   Location [facility] 4W/SW/SE Attending Jose Beltran, [address] [facility] Day # 3 PCP SIDNEY PHAM       Subjective:   Julia Gibbons 26.0 01/22/2020     (H) 01/22/2020    CA 8.3 (L) 01/22/2020    ALB 2.9 (L) 01/21/2020    ALKPHO 82 01/21/2020    BILT 0.6 01/21/2020    TP 6.4 01/21/2020    AST 17 01/21/2020    ALT 16 01/21/2020                       Recommendations:  PT/OT: per or

## 2020-01-23 NOTE — PHYSICAL THERAPY NOTE
PHYSICAL THERAPY KNEE TREATMENT NOTE - INPATIENT     Room Number: 432/432-A             Presenting Problem: R TKA    Problem List  Principal Problem:    Arthritis of knee  Active Problems:    COPD (chronic obstructive pulmonary disease) (HCC)    Iron defic techniques;Relaxation;Repositioning    BALANCE  Static Sitting: Fair +  Dynamic Sitting: Fair +  Static Standing: Fair -  Dynamic Standing: Fair -    ACTIVITY TOLERANCE           BP: 97/57  BP Location: Left arm  BP Method: Automatic  Patient Position: Sit Status Amb 2 x 50 ft w/ RW & min a   Goal #4 Patient will negotiate 4 stairs/one curb w/ assistive device and supervision   Goal #4   Current Status Navigated 4 stairs with min a   Goal #5  AROM 0 degrees extension to 95 degrees flexion     Goal #5   Stacie

## 2020-01-24 VITALS
RESPIRATION RATE: 18 BRPM | OXYGEN SATURATION: 94 % | BODY MASS INDEX: 22.71 KG/M2 | SYSTOLIC BLOOD PRESSURE: 98 MMHG | HEIGHT: 64 IN | DIASTOLIC BLOOD PRESSURE: 60 MMHG | WEIGHT: 133 LBS | TEMPERATURE: 98 F | HEART RATE: 71 BPM

## 2020-01-24 PROCEDURE — 97530 THERAPEUTIC ACTIVITIES: CPT

## 2020-01-24 PROCEDURE — 97110 THERAPEUTIC EXERCISES: CPT

## 2020-01-24 PROCEDURE — 97116 GAIT TRAINING THERAPY: CPT

## 2020-01-24 RX ORDER — TRAMADOL HYDROCHLORIDE 50 MG/1
50 TABLET ORAL EVERY 6 HOURS PRN
Qty: 90 TABLET | Refills: 0 | Status: SHIPPED | OUTPATIENT
Start: 2020-01-24

## 2020-01-24 RX ORDER — POLYETHYLENE GLYCOL 3350 17 G/17G
17 POWDER, FOR SOLUTION ORAL DAILY PRN
Refills: 0 | Status: SHIPPED | COMMUNITY
Start: 2020-01-24

## 2020-01-24 NOTE — PLAN OF CARE
Pain managed with PRN tramadol Voiding freely. Up 1/walker. Plan to d[facility] with [facility].     Problem: Patient/Family Goals  Goal: Patient/Family Long Term Goal  Description  Patient's Long Term Goal: Go home    Interventions:  - Work with physical/occupatio difficulty  - Respiratory Therapy support as indicated  - Manage/alleviate anxiety  - Monitor for signs/symptoms of CO2 retention  Outcome: Adequate for Discharge     Problem: GENITOURINARY - ADULT  Goal: Absence of urinary retention  Description  Dayron Bean analgesics based on type and severity of pain and evaluate response  - Implement non-pharmacological measures as appropriate and evaluate response  - Consider cultural and social influences on pain and pain management  - Manage/alleviate anxiety  - Utilize Complete POLST form as appropriate  - Assess patient's ability to be responsible for managing their own health  - Refer to Case Management Department for coordinating discharge planning if the patient needs post-hospital services based on physician/LIP ord

## 2020-01-24 NOTE — PHYSICAL THERAPY NOTE
PHYSICAL THERAPY KNEE TREATMENT NOTE - INPATIENT     Room Number: 432/432-A             Presenting Problem: R TKA    Problem List  Principal Problem:    Arthritis of knee  Active Problems:    COPD (chronic obstructive pulmonary disease) (HCC)    Iron defic techniques;Relaxation;Repositioning    BALANCE  Static Sitting: Fair +  Dynamic Sitting: Fair +  Static Standing: Fair -  Dynamic Standing: Fair -    ACTIVITY TOLERANCE           BP: 98/60  BP Location: Left arm  BP Method: Automatic  Patient Position: [employer] 76 ft w/ RW & min a   Goal #4 Patient will negotiate 4 stairs/one curb w/ assistive device and supervision   Goal #4   Current Status Navigated 8 stairs with min a   Goal #5  AROM 0 degrees extension to 95 degrees flexion     Goal #5   Current Status IN MS

## 2020-01-24 NOTE — PROGRESS NOTES
[place] [facility] - [facility]    Ortho Medical Progress Note     [address] [facility],[address] Patient Status:  Inpatient    1940 MRN [id number]   Location [facility] 4W/SW/SE Attending Ning Bingham, [address] [facility] Day # 4 PCP SIDNEY PHAM       Subjective:   Batool Valles 01/21/2020    HGB 11.6 (L) 01/22/2020    HCT 36.9 01/22/2020    .0 01/21/2020    CREATSERUM 0.57 01/22/2020    BUN 11 01/22/2020     01/22/2020    K 4.0 01/22/2020     01/22/2020    CO2 26.0 01/22/2020     (H) 01/22/2020    CA 8.3

## 2020-01-24 NOTE — PLAN OF CARE
Pt is POD #3-4. Vital signs within normal limits. PRN Tramadol and Flexeril provided for pain along with scheduled Tylenol. Pt still extremely anxious/agitated at times. Conversation/distraction provided. On room air. Tolerating general diet. Voids freely. respiratory status  - Assess for changes in mentation and behavior  - Position to facilitate oxygenation and minimize respiratory effort  - Oxygen supplementation based on oxygen saturation or ABGs  - Provide Smoking Cessation handout, if applicable  - Enc and hygiene practices  - Implement preventative oral hygiene regimen  - Implement oral medicated treatments as ordered  Outcome: Progressing     Problem: PAIN - ADULT  Goal: Verbalizes/displays adequate comfort level or patient's stated pain goal  Descript caregiver  - Include patient/family/discharge partner in discharge planning  - Arrange for needed discharge resources and transportation as appropriate  - Identify discharge learning needs (meds, wound care, etc)  - Arrange for interpreters to assist at [facility]

## 2021-02-13 DIAGNOSIS — Z23 NEED FOR VACCINATION: ICD-10-CM

## 2021-06-04 ENCOUNTER — HOSPITAL ENCOUNTER (INPATIENT)
Facility: HOSPITAL | Age: 81
LOS: 5 days | Discharge: HOME | DRG: 812 | End: 2021-06-09
Attending: EMERGENCY MEDICINE | Admitting: INTERNAL MEDICINE
Payer: MEDICARE

## 2021-06-04 ENCOUNTER — APPOINTMENT (OUTPATIENT)
Dept: GENERAL RADIOLOGY | Facility: HOSPITAL | Age: 81
DRG: 812 | End: 2021-06-04
Attending: EMERGENCY MEDICINE
Payer: MEDICARE

## 2021-06-04 DIAGNOSIS — I50.83 HIGH OUTPUT HEART FAILURE (HCC): ICD-10-CM

## 2021-06-04 DIAGNOSIS — D64.9 SEVERE ANEMIA: Primary | ICD-10-CM

## 2021-06-04 PROCEDURE — 99223 1ST HOSP IP/OBS HIGH 75: CPT | Performed by: INTERNAL MEDICINE

## 2021-06-04 PROCEDURE — 30233N1 TRANSFUSION OF NONAUTOLOGOUS RED BLOOD CELLS INTO PERIPHERAL VEIN, PERCUTANEOUS APPROACH: ICD-10-PCS | Performed by: EMERGENCY MEDICINE

## 2021-06-04 PROCEDURE — 71045 X-RAY EXAM CHEST 1 VIEW: CPT | Performed by: EMERGENCY MEDICINE

## 2021-06-04 RX ORDER — GABAPENTIN 300 MG/1
300 CAPSULE ORAL 3 TIMES DAILY
COMMUNITY

## 2021-06-04 RX ORDER — ESCITALOPRAM OXALATE 20 MG/1
20 TABLET ORAL DAILY
COMMUNITY

## 2021-06-04 RX ORDER — ONDANSETRON 2 MG/ML
4 INJECTION INTRAMUSCULAR; INTRAVENOUS EVERY 4 HOURS PRN
Status: DISCONTINUED | OUTPATIENT
Start: 2021-06-04 | End: 2021-06-09

## 2021-06-04 RX ORDER — FUROSEMIDE 10 MG/ML
20 INJECTION INTRAMUSCULAR; INTRAVENOUS EVERY 4 HOURS
Status: COMPLETED | OUTPATIENT
Start: 2021-06-04 | End: 2021-06-04

## 2021-06-04 RX ORDER — ACETAMINOPHEN 325 MG/1
650 TABLET ORAL EVERY 6 HOURS PRN
Status: DISCONTINUED | OUTPATIENT
Start: 2021-06-04 | End: 2021-06-09

## 2021-06-04 RX ORDER — SODIUM CHLORIDE 9 MG/ML
INJECTION, SOLUTION INTRAVENOUS ONCE
Status: COMPLETED | OUTPATIENT
Start: 2021-06-04 | End: 2021-06-04

## 2021-06-04 NOTE — H&P
Carl Khanna [facility] Patient Status:  Inpatient    1940 MRN [id number]   [facility] 2W/SW Attending Rosalina Do Day # 0 ODALYS Castillo     Date:  2021  Date of arteriosclerosis; heart attack   • Other (Other) Mother         Major merle Gregg heart     Social History:  Social History    Tobacco Use      Smoking status: Former Smoker        Packs/day: 1.00        Years: 50.00        Pack years: 50        Types taking: Reported on 6/4/2021 )  Calcium Carbonate-Vitamin D (CALTRATE 600+D OR), Take by mouth daily. TURMERIC OR, Take by mouth daily. Multiple Vitamins-Minerals (PRESERVISION AREDS) Oral Tab, Take by mouth daily. NON FORMULARY, Take by mouth daily.  Ba XR CHEST AP PORTABLE  (CPT=71045)    Result Date: 6/4/2021  CONCLUSION:  1. Mild Cardiomegaly. Tortuous atherosclerotic aorta. 2. Minimal basilar atelectasis/scarring. No acute pulmonary disease.     Dictated by (CST): Deondre Wong MD on 6/04

## 2021-06-04 NOTE — ED INITIAL ASSESSMENT (HPI)
Pt presents to ED with anemia, pt reports shortness of breath, weakness, nausea, denies chest pain. Pt hx of this same complaint a few years ago. Pt's hemoglobin 4.3 and .

## 2021-06-04 NOTE — ED QUICK NOTES
Orders for admission, patient is aware of plan and ready to go upstairs. Any questions, please call ED RN Apple Medina  at extension [phone].    Type of COVID test sent: Rapid  COVID Suspicion level: Low      LOC at time of transport:AOx4    Other pertinent informati

## 2021-06-04 NOTE — CONSULTS
[facility]   Gastroenterology Consultation Note    Jared Martinez  Patient Status:    Emergency  Date of Admission:         6/4/2021, Hospital day #0  Attending:   Mary Hernández MD  PCP:     Tanya Sapp    Reason for Consultation:  A approx 2005. L side     Family History   Problem Relation Age of Onset   • Other (Other) Father         arteriosclerosis; heart attack   • Other (Other) Mother         [facility] heart      reports that she quit smoking about 10 years ago.  Her smo interactive    Laboratory Data:  Lab Results   Component Value Date    WBC 5.2 06/04/2021    HGB 4.0 06/04/2021    HCT 16.6 06/04/2021    .0 06/04/2021    CREATSERUM 0.90 06/04/2021    BUN 19 06/04/2021     06/04/2021    K 4.0 06/04/2021    CL surgical intervention, or even death. I also specifically mentioned the miss rate of colonoscopy of 5-10% in the best of all circumstances.  The patient has agreed to sign an informed consent and elected to proceed with upper endoscopy and colonoscopy with

## 2021-06-04 NOTE — H&P (VIEW-ONLY)
[facility]   Gastroenterology Consultation Note    Les Rankin  Patient Status:    Emergency  Date of Admission:         6/4/2021, Hospital day #0  Attending:   Ryan Recio MD  PCP:     Margaret Castillo    Reason for Consultation:  A approx 2005. L side     Family History   Problem Relation Age of Onset   • Other (Other) Father         arteriosclerosis; heart attack   • Other (Other) Mother         Anibal Beltran, [facility] heart      reports that she quit smoking about 10 years ago.  Her smo interactive    Laboratory Data:  Lab Results   Component Value Date    WBC 5.2 06/04/2021    HGB 4.0 06/04/2021    HCT 16.6 06/04/2021    .0 06/04/2021    CREATSERUM 0.90 06/04/2021    BUN 19 06/04/2021     06/04/2021    K 4.0 06/04/2021    CL surgical intervention, or even death. I also specifically mentioned the miss rate of colonoscopy of 5-10% in the best of all circumstances.  The patient has agreed to sign an informed consent and elected to proceed with upper endoscopy and colonoscopy with

## 2021-06-04 NOTE — ED PROVIDER NOTES
Patient Seen in: [facility] AND [facility] Emergency Department      History   Patient presents with:  Anemia    Stated Complaint: anemia    HPI/Subjective:   HPI    The patient is an 66-year-old female who presents with 2 weeks of fatigue and dyspnea on exertio Drug use: No             Review of Systems    Positive for stated complaint: anemia  Other systems are as noted in HPI. Constitutional and vital signs reviewed. All other systems reviewed and negative except as noted above.     Physical Exam     ED Tr Tendon Reflexes: Reflexes are normal and symmetric.    Psychiatric:         Judgment: Judgment normal.     Differential diagnosis includes GI bleed, other iron deficiency anemia, heart failure          ED Course     Labs Reviewed   BASIC METABOLIC PANEL (8) individual orders. MD BLOOD SMEAR CONSULT   SCAN SLIDE   TYPE AND SCREEN    Narrative: The following orders were created for panel order Type and screen.   Procedure                               Abnormality         Status                     -------- Disposition and Plan     Clinical Impression:  Severe anemia  (primary encounter diagnosis)  High output heart failure ([facility] [address]     Disposition:  Admit  6/4/2021  2:34 pm    Follow-up:  No follow-up provider specified.   We recommend that you sc

## 2021-06-05 ENCOUNTER — APPOINTMENT (OUTPATIENT)
Dept: CV DIAGNOSTICS | Facility: HOSPITAL | Age: 81
DRG: 812 | End: 2021-06-05
Attending: INTERNAL MEDICINE
Payer: MEDICARE

## 2021-06-05 PROCEDURE — 93306 TTE W/DOPPLER COMPLETE: CPT | Performed by: INTERNAL MEDICINE

## 2021-06-05 PROCEDURE — 99232 SBSQ HOSP IP/OBS MODERATE 35: CPT | Performed by: INTERNAL MEDICINE

## 2021-06-05 RX ORDER — ESCITALOPRAM OXALATE 10 MG/1
20 TABLET ORAL DAILY
Status: DISCONTINUED | OUTPATIENT
Start: 2021-06-05 | End: 2021-06-09

## 2021-06-05 RX ORDER — SODIUM CHLORIDE 9 MG/ML
INJECTION, SOLUTION INTRAVENOUS ONCE
Status: COMPLETED | OUTPATIENT
Start: 2021-06-05 | End: 2021-06-05

## 2021-06-05 RX ORDER — ALPRAZOLAM 0.25 MG/1
0.25 TABLET ORAL EVERY 6 HOURS PRN
Status: DISCONTINUED | OUTPATIENT
Start: 2021-06-05 | End: 2021-06-09

## 2021-06-05 RX ORDER — LISINOPRIL 10 MG/1
10 TABLET ORAL DAILY
Status: DISCONTINUED | OUTPATIENT
Start: 2021-06-05 | End: 2021-06-09

## 2021-06-05 RX ORDER — GABAPENTIN 300 MG/1
300 CAPSULE ORAL 3 TIMES DAILY
Status: DISCONTINUED | OUTPATIENT
Start: 2021-06-05 | End: 2021-06-09

## 2021-06-05 NOTE — PROGRESS NOTES
Patient arrived from ED by cart with RN Vidya Gill with daughter. Patient a/o x 4, pleasant. First unit of blood transfusing on arrival. Per Dr Gabi Rios, patient will receive 2 units PRBCs total, CBC recheck, and transfuse one additional unit if Hbg under 7.  [place]

## 2021-06-05 NOTE — PROGRESS NOTES
[place] [facility]    Progress Note    [address] [facility],[address] Patient Status:  Inpatient    1940 MRN [id number]   Location [facility] 2W/SW Attending [address], [address] [facility] Day # 1 PCP SIDNEY PHAM       Subjective:   Med Velazquez Stim AST 17 01/21/2020    ALT 16 01/21/2020    TROP <0.045 06/04/2021    B12 832 06/04/2021       XR CHEST AP PORTABLE  (CPT=71045)    Result Date: 6/4/2021  CONCLUSION:  1. Mild Cardiomegaly. Tortuous atherosclerotic aorta.  2. Minimal basilar atelectasis/s

## 2021-06-05 NOTE — PLAN OF CARE
Problem: Patient Centered Care  Goal: Patient preferences are identified and integrated in the patient's plan of care  Description: Interventions:  - What would you like us to know as we care for you? \" I prefer to to use the toilet. \"  - Provide timely needs related to functional status, cognitive ability or social support system  Outcome: Not Progressing     Problem: GASTROINTESTINAL - ADULT  Goal: Minimal or absence of nausea and vomiting  Description: INTERVENTIONS:  - Maintain adequate hydration with answered.

## 2021-06-05 NOTE — PLAN OF CARE
Problem: GASTROINTESTINAL - ADULT  Goal: Minimal or absence of nausea and vomiting  Description: INTERVENTIONS:  - Maintain adequate hydration with IV or PO as ordered and tolerated  - Nasogastric tube to low intermittent suction as ordered  - Evaluate e to bathroom to urinate, she was given Lasix after her transfusion. She says she wants to sleep, she feels so tired.

## 2021-06-05 NOTE — PROGRESS NOTES
[facility]     Gastroenterology Progress Note    [place] Patient Status:  Inpatient    1940 MRN [id number]   Location [facility] 2W/SW Attending [address], [address] [facility] Day # 1 PCP SIDNEY WILLIS (36.1 °C) Temporal — — — —   06/05/21 0500 108/59 96.8 °F (36 °C) Temporal 68 16 90 % —   06/05/21 0400 111/53 97 °F (36.1 °C) Temporal 72 23 93 % —   06/05/21 0312 125/64 97.3 °F (36.3 °C) Temporal 71 24 93 % —   06/05/21 0252 117/55 97.3 °F (36.3 °C) Tem 2.73*  --  3.81   HGB 4.0* 6.5* 7.8*   HCT 16.6* 23.0* 27.5*   MCV 60.8*  --  72.2*   MCH 14.7*  --  20.5*   MCHC 24.1*  --  28.4*   RDW 23.2*  --  31.3*   NEPRELIM 3.26  --   --    WBC 5.2  --  6.7   .0  --  357.0         Recent Labs   Lab 06/04/21

## 2021-06-06 PROCEDURE — 99232 SBSQ HOSP IP/OBS MODERATE 35: CPT | Performed by: INTERNAL MEDICINE

## 2021-06-06 NOTE — PLAN OF CARE
Problem: Patient Centered Care  Goal: Patient preferences are identified and integrated in the patient's plan of care  Description: Interventions:  - What would you like us to know as we care for you? \" I'm having the procedure on Monday. \"  - Provide t as needed  - Evaluate fluid balance  Outcome: Progressing     Problem: HEMATOLOGIC - ADULT  Goal: Maintains hematologic stability  Description: INTERVENTIONS  - Assess for signs and symptoms of bleeding or hemorrhage  - Monitor labs and vital signs for almaz BR. No active bleeding noted. Able to sleep through the night. Overall uneventful shift.

## 2021-06-06 NOTE — PROGRESS NOTES
[facility]     Gastroenterology Progress Note    [place] Patient Status:  Inpatient    1940 MRN [id number]   Location [facility] 2W/SW Attending [address], [address] [facility] Day # 2 PCP SIDNEY WILLIS bilaterally  Abdomen:Non-distended. Bowel sounds are present. There is no tenderness to palpation. There are no masses appreciated. Liver and spleen are not palpable. Skin: Pale. Warm and dry. Ext: No cyanosis, clubbing or edema is evident.    Neuro-

## 2021-06-06 NOTE — PROGRESS NOTES
[place] [facility] - [facility]    Progress Note    [address] [facility],[address] Patient Status:  Inpatient    1940 MRN [id number]   Location [facility] 2W/SW Attending [address], [address] [facility] Day # 2 PCP SIDNEY PHAM       Subjective:   Virgen Blake Stim 4.0 06/04/2021     06/04/2021    CO2 23.0 06/04/2021     (H) 06/04/2021    CA 8.9 06/04/2021    ALB 2.9 (L) 01/21/2020    ALKPHO 82 01/21/2020    BILT 0.6 01/21/2020    TP 6.4 01/21/2020    AST 17 01/21/2020    ALT 16 01/21/2020    DDIMER 0.63

## 2021-06-06 NOTE — CONSULTS
[facility]    PATIENT'S NAME: Vidal Maggie   ATTENDING PHYSICIAN: Moreno Laboy MD   CONSULTING PHYSICIAN: Jerome Cifuentes DO   PATIENT ACCOUNT#:   [de-identified]    LOCATION:  [address] [place] [address] Drive RECORD #:   [id number]       DATE OF BIRTH: area 1.25 sq cm. Ejection fraction 55% to 60%. IMPRESSION:    1. Mild to moderate aortic stenosis. 2.   Dyspnea on exertion. No active pulmonary edema on chest x-ray.   3.   Symptomatic anemia, more likely cause of the shortness of breath on activit

## 2021-06-07 ENCOUNTER — TELEPHONE (OUTPATIENT)
Dept: GASTROENTEROLOGY | Facility: CLINIC | Age: 81
End: 2021-06-07

## 2021-06-07 ENCOUNTER — MED REC SCAN ONLY (OUTPATIENT)
Dept: GASTROENTEROLOGY | Facility: CLINIC | Age: 81
End: 2021-06-07

## 2021-06-07 ENCOUNTER — ANESTHESIA EVENT (OUTPATIENT)
Dept: ENDOSCOPY | Facility: HOSPITAL | Age: 81
DRG: 812 | End: 2021-06-07
Payer: MEDICARE

## 2021-06-07 ENCOUNTER — ANESTHESIA (OUTPATIENT)
Dept: ENDOSCOPY | Facility: HOSPITAL | Age: 81
DRG: 812 | End: 2021-06-07
Payer: MEDICARE

## 2021-06-07 PROCEDURE — 0DJD8ZZ INSPECTION OF LOWER INTESTINAL TRACT, VIA NATURAL OR ARTIFICIAL OPENING ENDOSCOPIC: ICD-10-PCS | Performed by: INTERNAL MEDICINE

## 2021-06-07 PROCEDURE — 45378 DIAGNOSTIC COLONOSCOPY: CPT | Performed by: INTERNAL MEDICINE

## 2021-06-07 PROCEDURE — 0DJ08ZZ INSPECTION OF UPPER INTESTINAL TRACT, VIA NATURAL OR ARTIFICIAL OPENING ENDOSCOPIC: ICD-10-PCS | Performed by: INTERNAL MEDICINE

## 2021-06-07 PROCEDURE — 43235 EGD DIAGNOSTIC BRUSH WASH: CPT | Performed by: INTERNAL MEDICINE

## 2021-06-07 RX ORDER — PANTOPRAZOLE SODIUM 40 MG/1
40 TABLET, DELAYED RELEASE ORAL 2 TIMES DAILY
Status: DISCONTINUED | OUTPATIENT
Start: 2021-06-07 | End: 2021-06-09

## 2021-06-07 RX ORDER — SODIUM CHLORIDE, SODIUM LACTATE, POTASSIUM CHLORIDE, CALCIUM CHLORIDE 600; 310; 30; 20 MG/100ML; MG/100ML; MG/100ML; MG/100ML
INJECTION, SOLUTION INTRAVENOUS CONTINUOUS PRN
Status: DISCONTINUED | OUTPATIENT
Start: 2021-06-07 | End: 2021-06-07 | Stop reason: SURG

## 2021-06-07 RX ADMIN — SODIUM CHLORIDE, SODIUM LACTATE, POTASSIUM CHLORIDE, CALCIUM CHLORIDE: 600; 310; 30; 20 INJECTION, SOLUTION INTRAVENOUS at 09:11:00

## 2021-06-07 RX ADMIN — SODIUM CHLORIDE, SODIUM LACTATE, POTASSIUM CHLORIDE, CALCIUM CHLORIDE: 600; 310; 30; 20 INJECTION, SOLUTION INTRAVENOUS at 10:03:00

## 2021-06-07 NOTE — OPERATIVE REPORT
ESOPHAGOGASTRODUODENOSCOPY (EGD) & COLONOSCOPY REPORT    Stefanie Mason     1940 Age [de-identified]year old   PCP SIDNEY PHAM Endoscopist Miriam Sanchez MD     Date of procedure: 21    Procedure: EGD & Colonoscopy     Pre-operative diagnosis: iron deficienc with thorough washing and careful examination of the colonic walls and folds. Photodocumentation was obtained. BBPS 4 (0/2/2). I then carefully withdrew the instrument from the patient who tolerated the procedure well.      Complications: None    EGD findin iron deficiency anemia  · Pan diverticulosis with fixed sigmoid angulations requiring exchange to adult gastroscope and manual abdominal pressure to facilitate scope advancement  · Colonoscopy to the proximal ascending colon did not identify source of

## 2021-06-07 NOTE — PLAN OF CARE
Problem: Patient Centered Care  Goal: Patient preferences are identified and integrated in the patient's plan of care  Description: Interventions:  - What would you like us to know as we care for you? \" I had this problem years ago but not this bad. \" ability or social support system  Outcome: Not Progressing     Problem: GASTROINTESTINAL - ADULT  Goal: Minimal or absence of nausea and vomiting  Description: INTERVENTIONS:  - Maintain adequate hydration with IV or PO as ordered and tolerated  - Nasogast Evaluate effectiveness of vasoactive medications to optimize hemodynamic stability  - Monitor arterial and/or venous puncture sites for bleeding and/or hematoma  - Assess quality of pulses, skin color and temperature  - Assess for signs of decreased corona

## 2021-06-07 NOTE — PROGRESS NOTES
[facility] Note: Route Optimization for Pantoprazole (PROTONIX)    Patient is currently on Pantoprazole (PROTONIX) 40 mg IV every 12 hours.    The patient meets the criteria to convert to the oral equivalent as established by the IV to Oral conversion pro

## 2021-06-07 NOTE — ANESTHESIA POSTPROCEDURE EVALUATION
Patient: Yen Subramanian    Procedure Summary     Date: 06/07/21 Room / Location: [facility] ENDOSCOPY 01 / [facility] ENDOSCOPY    Anesthesia Start: 6876 Anesthesia Stop: 1824    Procedures:       ESOPHAGOGASTRODUODENOSCOPY (EGD) (N/A )      COLONOSCOPY (N/A ) Shereen Dallas

## 2021-06-07 NOTE — PLAN OF CARE
Problem: Patient Centered Care  Goal: Patient preferences are identified and integrated in the patient's plan of care  Description: Interventions:  - What would you like us to know as we care for you?   - Provide timely, complete, and accurate informatio and vomiting  Description: INTERVENTIONS:  - Maintain adequate hydration with IV or PO as ordered and tolerated  - Nasogastric tube to low intermittent suction as ordered  - Evaluate effectiveness of ordered antiemetic medications  - Provide nonpharmacolog

## 2021-06-07 NOTE — TELEPHONE ENCOUNTER
INPATIENT ORDERS:  - please schedule f/u with Dr. Qi Bernstein or Anam MITCHELL in 4-6 weeks for hospital follow up    Thank you

## 2021-06-07 NOTE — ANESTHESIA PREPROCEDURE EVALUATION
Anesthesia PreOp Note    HPI:     Ernesto Lugo is a [de-identified]year old female who presents for preoperative consultation requested by:  Daria Bob MD    Date of Surgery: 6/4/2021 - 6/7/2021    Procedure(s):  ESOPHAGOGASTRODUODENOSCOPY (EGD)  COLONOSCOPY  I medical diagnostic radiation    • High blood pressure    • History of blood transfusion     no reactions,[facility], 2017 ,    • HL     mild   • Incontinence    • Osteoarthritis     b/l knees   • Problems with swallowing     upper dentures   • Tobacco abuse, in r time  PEG 3350 Oral Powd Pack, Take 17 g by mouth daily as needed.  For constipation (Patient not taking: Reported on 6/4/2021 ), Disp: , Rfl: 0, Not Taking at Unknown time  cyclobenzaprine 5 MG Oral Tab, Take 1 tablet (5 mg total) by mouth every 8 (eight) MD, 4 mg at 06/05/21 1016  acetaminophen (TYLENOL) tab 650 mg, 650 mg, Oral, Q6H PRN, Jayant Parham MD, 650 mg at 06/07/21 0306    No current [facility]-ordered outpatient medications on file.       No Known Allergies    Family History   Problem [employer] Club or Organization Meetings:       Marital Status:   Intimate Partner Violence:       Fear of Current or Ex-Partner:       Emotionally Abused:       Physically Abused:       Sexually Abused:     Available pre-op labs reviewed.   Lab Results   Component Va complications, and any alternative forms of anesthetic management. All of the patient's questions were answered to the best of my ability. The patient desires the anesthetic management as planned.   Claudy Sotelo  6/7/2021 8:59 AM

## 2021-06-08 RX ORDER — FUROSEMIDE 10 MG/ML
40 INJECTION INTRAMUSCULAR; INTRAVENOUS ONCE
Status: COMPLETED | OUTPATIENT
Start: 2021-06-08 | End: 2021-06-08

## 2021-06-08 RX ORDER — SODIUM CHLORIDE 0.9 % (FLUSH) 0.9 %
10 SYRINGE (ML) INJECTION AS NEEDED
Status: DISCONTINUED | OUTPATIENT
Start: 2021-06-08 | End: 2021-06-09

## 2021-06-08 RX ORDER — ALBUTEROL SULFATE 90 UG/1
2 AEROSOL, METERED RESPIRATORY (INHALATION) EVERY 6 HOURS PRN
Status: DISCONTINUED | OUTPATIENT
Start: 2021-06-08 | End: 2021-06-09

## 2021-06-08 RX ORDER — POTASSIUM CHLORIDE 20 MEQ/1
40 TABLET, EXTENDED RELEASE ORAL EVERY 4 HOURS
Status: COMPLETED | OUTPATIENT
Start: 2021-06-08 | End: 2021-06-09

## 2021-06-08 RX ORDER — PANTOPRAZOLE SODIUM 40 MG/1
40 TABLET, DELAYED RELEASE ORAL 2 TIMES DAILY
Qty: 60 TABLET | Refills: 0 | Status: SHIPPED | OUTPATIENT
Start: 2021-06-08 | End: 2021-07-26

## 2021-06-08 NOTE — PROGRESS NOTES
[place] [facility] - [facility]    Progress Note    [address] [facility],[address] Patient Status:  Inpatient    1940 MRN [id number]   Location [facility] 2W/SW Attending [place], [address] Day # 4 PCP SIDNEY PHAM       Subjective:   Janice Miguel Stim 06/08/2021    GLU 85 06/08/2021    CA 8.1 (L) 06/08/2021    ALB 3.0 (L) 06/08/2021    ALKPHO 69 06/08/2021    BILT 0.8 06/08/2021    TP 6.4 06/08/2021    AST 20 06/08/2021    ALT 18 06/08/2021    DDIMER 0.63 06/05/2021    TROP <0.045 06/04/2021    B12 832

## 2021-06-08 NOTE — PLAN OF CARE
Uneventful night. Pt A/Ox4. VSS on RA. NSR on tele. No BM overnight. No s/s bleeding. Pt up ad milena independently to bathroom, steady gait, voiding without difficulty. No complaints overnight. Slept well. Trending labs. Will continue to monitor.        Probl post-hospital services based on physician/LIP order or complex needs related to functional status, cognitive ability or social support system  Outcome: Progressing     Problem: GASTROINTESTINAL - ADULT  Goal: Minimal or absence of nausea and vomiting  Desc Oxygen supplementation based on oxygen saturation or ABGs  - Provide Smoking Cessation handout, if applicable  - Encourage broncho-pulmonary hygiene including cough, deep breathe, Incentive Spirometry  - Assess the need for suctioning and perform as needed

## 2021-06-08 NOTE — PROGRESS NOTES
Patient seen in follow up. Patient reports SOB on exertion still, though mildly improved. She denies chest pain and palpitations. She reports swelling in her BLE has improved. 06/08/21  1522   BP: 122/65   Pulse: 78   Resp: 20   Temp: 98.3 °F (36. as needed for Pain. Calcium Carbonate-Vitamin D (CALTRATE 600+D OR), Take by mouth daily. TURMERIC OR, Take by mouth daily. Multiple Vitamins-Minerals (PRESERVISION AREDS) Oral Tab, Take by mouth daily.   lisinopril 10 MG Oral Tab, Take 10 mg by mouth da Ejection fraction 55% to 60%. 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 Currently not indicated for any kind of surgical or minimally invasive procedures. 2.  Dyspnea on exertion. No active pulmonary edema on chest x-ray.     3.   Sympto

## 2021-06-08 NOTE — PROGRESS NOTES
[place] [facility] - [facility]    Progress Note    [address] Patient Status:  Inpatient    1940 MRN [id number]   Location [facility] 2W/SW Attending [address], [address] [facility] Day # 3 PCP SIDNEY PHAM       Subjective:   Durenda Chain Stim ALB 2.9 (L) 01/21/2020    ALKPHO 82 01/21/2020    BILT 0.6 01/21/2020    TP 6.4 01/21/2020    AST 17 01/21/2020    ALT 16 01/21/2020    DDIMER 0.63 06/05/2021    TROP <0.045 06/04/2021    B12 832 06/04/2021       No results found.             Assessment and

## 2021-06-09 VITALS
WEIGHT: 148 LBS | TEMPERATURE: 98 F | BODY MASS INDEX: 26.22 KG/M2 | HEART RATE: 72 BPM | SYSTOLIC BLOOD PRESSURE: 141 MMHG | RESPIRATION RATE: 20 BRPM | HEIGHT: 63 IN | OXYGEN SATURATION: 96 % | DIASTOLIC BLOOD PRESSURE: 72 MMHG

## 2021-06-09 PROBLEM — R06.02 SOB (SHORTNESS OF BREATH): Status: ACTIVE | Noted: 2021-06-09

## 2021-06-09 RX ORDER — METHYLPREDNISOLONE SODIUM SUCCINATE 125 MG/2ML
60 INJECTION, POWDER, LYOPHILIZED, FOR SOLUTION INTRAMUSCULAR; INTRAVENOUS ONCE
Status: COMPLETED | OUTPATIENT
Start: 2021-06-09 | End: 2021-06-09

## 2021-06-09 RX ORDER — ALBUTEROL SULFATE 90 UG/1
2 AEROSOL, METERED RESPIRATORY (INHALATION) EVERY 6 HOURS PRN
Qty: 1 EACH | Refills: 0 | Status: SHIPPED | OUTPATIENT
Start: 2021-06-09

## 2021-06-09 RX ORDER — MELATONIN
325
Qty: 30 TABLET | Refills: 0 | Status: SHIPPED | OUTPATIENT
Start: 2021-06-09

## 2021-06-09 RX ORDER — PREDNISONE 10 MG/1
TABLET ORAL
Qty: 12 TABLET | Refills: 0 | Status: SHIPPED | OUTPATIENT
Start: 2021-06-09

## 2021-06-09 NOTE — PLAN OF CARE
Up and about in room, still with shortness of breath with activity. Denies any complaints of pain. Lung sounds clear. Vital signs within normal range. Possible discharge in am. Bed in low position, call light within reach.   Problem: [address] Souza products/factors as ordered and appropriate  Outcome: Progressing  Goal: Free from bleeding injury  Description: (Example usage: patient with low platelets)  INTERVENTIONS:  - Avoid intramuscular injections, enemas and rectal medication administration  - E ex. Angina  - Evaluate fluid balance, assess for edema, trend weights  Outcome: Progressing

## 2021-06-09 NOTE — PROGRESS NOTES
[facility]    Progress Note    [address] [facility],[address] Patient Status:  Inpatient    1940 MRN [id number]   Location [facility] 2W/SW Attending [address], [address] Day # 5 PCP SIDNEY PHAM       Subjective:   Azeem Mealing Stim 06/09/2021    K 4.6 06/09/2021    K 4.6 06/09/2021     06/09/2021    CO2 29.0 06/09/2021    GLU 84 06/09/2021    CA 8.6 06/09/2021    ALB 3.0 (L) 06/08/2021    ALKPHO 69 06/08/2021    BILT 0.8 06/08/2021    TP 6.4 06/08/2021    AST 20 06/08/2021    A

## 2021-06-09 NOTE — PLAN OF CARE
Problem: Patient Centered Care  Goal: Patient preferences are identified and integrated in the patient's plan of care  Description: Interventions:  - What would you like us to know as we care for you?  \"I have a history of GI bleeds before\"  - Provide t Progressing     Problem: GASTROINTESTINAL - ADULT  Goal: Minimal or absence of nausea and vomiting  Description: INTERVENTIONS:  - Maintain adequate hydration with IV or PO as ordered and tolerated  - Nasogastric tube to low intermittent suction as ordered including cough, deep breathe, Incentive Spirometry  - Assess the need for suctioning and perform as needed  - Assess and instruct to report SOB or any respiratory difficulty  - Respiratory Therapy support as indicated  - Manage/alleviate anxiety  - Monito

## 2021-06-16 NOTE — DISCHARGE SUMMARY
[place] [facility] - [facility]    Discharge Summary    [address] [facility],[address] Patient Status:  Inpatient    1940 MRN [id number]   Location [facility] 1W Attending No att. providers found   Hosp Day # 5 PCP [address] [address]     Date of Admission: 2021 he was given IV iron.  Had evidence of fluid overload so was given a dose of IV Lasix and also given Solu-Medrol and a breathing status got improved once a hemoglobin remained stable she was discharged home and recommended to follow-up in the office as outp For constipation, Historical, R-0    cyclobenzaprine 5 MG Oral Tab  Take 1 tablet (5 mg total) by mouth every 8 (eight) hours as needed. , Print Script, Disp-45 tablet, R-1    ALPRAZolam 0.25 MG Oral Tab  Take 1 tablet (0.25 mg total) by mouth every 6 (six)

## 2021-06-17 ENCOUNTER — LAB ENCOUNTER (OUTPATIENT)
Dept: LAB | Facility: HOSPITAL | Age: 81
End: 2021-06-17
Attending: INTERNAL MEDICINE
Payer: MEDICARE

## 2021-06-17 DIAGNOSIS — D50.9 IRON DEFICIENCY ANEMIA, UNSPECIFIED IRON DEFICIENCY ANEMIA TYPE: ICD-10-CM

## 2021-06-17 DIAGNOSIS — I10 ESSENTIAL HYPERTENSION, BENIGN: ICD-10-CM

## 2021-06-17 PROCEDURE — 36415 COLL VENOUS BLD VENIPUNCTURE: CPT

## 2021-06-17 PROCEDURE — 80048 BASIC METABOLIC PNL TOTAL CA: CPT

## 2021-06-17 PROCEDURE — 85025 COMPLETE CBC W/AUTO DIFF WBC: CPT

## 2021-07-14 NOTE — PROGRESS NOTES
[address] [facility] Follow-up Visit    Natalie Ma with MAC related to DARRYN, findings: Ajce's erosions, large hiatal hernia, sigmoid diverticulosis with fixed sigmoid angulation, if DARRYN does not respond to indefinite PPI/iron supplementation, consider VCE to exclude small bowel lesions but likely low Family History   Problem Relation Age of Onset   • Other (Other) Father         arteriosclerosis; heart attack   • Other (Other) Mother         myodysplasia, irreg heart      Social History: Social History    Tobacco Use      Smoking status: Former Smoke Take 1-2 capsules (500-1,000 mg total) by mouth every 6 (six) hours as needed for Pain. 60 capsule 2   • docusate sodium 100 MG Oral Cap Take 1 capsule (100 mg total) by mouth 2 (two) times daily.  60 capsule 2   • Ondansetron HCl (ZOFRAN) 4 mg tablet Take Alert and oriented x4, and patient is having movements of all 4 extremities   Psych: Pt has a normal mood and affect, behavior is normal    Nursing note and vitals reviewed      Labs/Imaging:     Patient's labs and imaging were reviewed and discussed with times a day. • Ferrous Sulfate 324 (65 Fe) MG Oral Tab EC 30 tablet 5     Sig: Take 1 tablet by mouth daily. Imaging & Referrals:  None       NIKKO Vasquez  7/26/2021

## 2021-07-26 ENCOUNTER — OFFICE VISIT (OUTPATIENT)
Dept: GASTROENTEROLOGY | Facility: CLINIC | Age: 81
End: 2021-07-26
Payer: MEDICARE

## 2021-07-26 ENCOUNTER — TELEPHONE (OUTPATIENT)
Dept: GASTROENTEROLOGY | Facility: CLINIC | Age: 81
End: 2021-07-26

## 2021-07-26 VITALS
HEIGHT: 63 IN | WEIGHT: 143 LBS | HEART RATE: 68 BPM | DIASTOLIC BLOOD PRESSURE: 70 MMHG | SYSTOLIC BLOOD PRESSURE: 105 MMHG | BODY MASS INDEX: 25.34 KG/M2

## 2021-07-26 DIAGNOSIS — K44.9 HIATAL HERNIA: ICD-10-CM

## 2021-07-26 DIAGNOSIS — D50.0 IRON DEFICIENCY ANEMIA DUE TO CHRONIC BLOOD LOSS: Primary | ICD-10-CM

## 2021-07-26 PROCEDURE — 99215 OFFICE O/P EST HI 40 MIN: CPT | Performed by: NURSE PRACTITIONER

## 2021-07-26 RX ORDER — PANTOPRAZOLE SODIUM 40 MG/1
40 TABLET, DELAYED RELEASE ORAL 2 TIMES DAILY
Qty: 180 TABLET | Refills: 3 | Status: SHIPPED | OUTPATIENT
Start: 2021-07-26 | End: 2021-10-24

## 2021-07-26 RX ORDER — FERROUS SULFATE TAB EC 324 MG (65 MG FE EQUIVALENT) 324 (65 FE) MG
1 TABLET DELAYED RESPONSE ORAL DAILY
Qty: 30 TABLET | Refills: 5 | Status: SHIPPED | OUTPATIENT
Start: 2021-07-26 | End: 2021-08-25

## 2021-07-26 NOTE — TELEPHONE ENCOUNTER
Jordana Penny-    Do you want this patient to have labs completed prior to her appointment with you today at 2:30 PM for a hospital follow up visit?

## 2021-07-26 NOTE — TELEPHONE ENCOUNTER
Pt called in wondering if she needs to complete any blood work prior to the appt today.  Please follow up

## 2021-07-26 NOTE — TELEPHONE ENCOUNTER
Nursing: I would prefer to see the pt in office and review hospital records at that time - will determine if any labs are needed at the time of the visit

## 2021-08-11 ENCOUNTER — LAB ENCOUNTER (OUTPATIENT)
Dept: LAB | Facility: HOSPITAL | Age: 81
End: 2021-08-11
Attending: INTERNAL MEDICINE
Payer: MEDICARE

## 2021-08-11 DIAGNOSIS — D50.0 IRON DEFICIENCY ANEMIA DUE TO CHRONIC BLOOD LOSS: ICD-10-CM

## 2021-08-11 DIAGNOSIS — I10 ESSENTIAL HYPERTENSION, BENIGN: ICD-10-CM

## 2021-08-11 DIAGNOSIS — R30.0 DYSURIA: ICD-10-CM

## 2021-08-11 DIAGNOSIS — R53.83 FATIGUE, UNSPECIFIED TYPE: ICD-10-CM

## 2021-08-11 DIAGNOSIS — I10 HTN (HYPERTENSION): Primary | ICD-10-CM

## 2021-08-11 DIAGNOSIS — I51.9 HEART DISEASE, UNSPECIFIED: ICD-10-CM

## 2021-08-11 DIAGNOSIS — D50.9 IRON DEFICIENCY ANEMIA, UNSPECIFIED IRON DEFICIENCY ANEMIA TYPE: ICD-10-CM

## 2021-08-11 DIAGNOSIS — K44.9 HIATAL HERNIA: ICD-10-CM

## 2021-08-11 LAB
ALBUMIN SERPL-MCNC: 3.4 G/DL (ref 3.4–5)
ALBUMIN/GLOB SERPL: 0.9 {RATIO} (ref 1–2)
ALP LIVER SERPL-CCNC: 139 U/L
ALT SERPL-CCNC: 58 U/L
ANION GAP SERPL CALC-SCNC: 3 MMOL/L (ref 0–18)
AST SERPL-CCNC: 37 U/L (ref 15–37)
BASOPHILS # BLD AUTO: 0.04 X10(3) UL (ref 0–0.2)
BASOPHILS NFR BLD AUTO: 0.7 %
BILIRUB SERPL-MCNC: 0.4 MG/DL (ref 0.1–2)
BILIRUB UR QL: NEGATIVE
BUN BLD-MCNC: 17 MG/DL (ref 7–18)
BUN/CREAT SERPL: 20.2 (ref 10–20)
CALCIUM BLD-MCNC: 9.1 MG/DL (ref 8.5–10.1)
CHLORIDE SERPL-SCNC: 106 MMOL/L (ref 98–112)
CHOLEST SMN-MCNC: 201 MG/DL (ref ?–200)
CO2 SERPL-SCNC: 29 MMOL/L (ref 21–32)
COLOR UR: YELLOW
CREAT BLD-MCNC: 0.84 MG/DL
DEPRECATED HBV CORE AB SER IA-ACNC: 181.3 NG/ML
DEPRECATED RDW RBC AUTO: 57.1 FL (ref 35.1–46.3)
EOSINOPHIL # BLD AUTO: 0.19 X10(3) UL (ref 0–0.7)
EOSINOPHIL NFR BLD AUTO: 3.3 %
ERYTHROCYTE [DISTWIDTH] IN BLOOD BY AUTOMATED COUNT: 20.7 % (ref 11–15)
GLOBULIN PLAS-MCNC: 3.8 G/DL (ref 2.8–4.4)
GLUCOSE BLD-MCNC: 85 MG/DL (ref 70–99)
GLUCOSE UR-MCNC: NEGATIVE MG/DL
HCT VFR BLD AUTO: 40 %
HDLC SERPL-MCNC: 53 MG/DL (ref 40–59)
HGB BLD-MCNC: 12.4 G/DL
HGB UR QL STRIP.AUTO: NEGATIVE
IMM GRANULOCYTES # BLD AUTO: 0.01 X10(3) UL (ref 0–1)
IMM GRANULOCYTES NFR BLD: 0.2 %
IRON SATURATION: 17 %
IRON SERPL-MCNC: 51 UG/DL
KETONES UR-MCNC: NEGATIVE MG/DL
LDLC SERPL CALC-MCNC: 130 MG/DL (ref ?–100)
LYMPHOCYTES # BLD AUTO: 0.82 X10(3) UL (ref 1–4)
LYMPHOCYTES NFR BLD AUTO: 14.3 %
M PROTEIN MFR SERPL ELPH: 7.2 G/DL (ref 6.4–8.2)
MCH RBC QN AUTO: 29.5 PG (ref 26–34)
MCHC RBC AUTO-ENTMCNC: 31 G/DL (ref 31–37)
MCV RBC AUTO: 95.2 FL
MONOCYTES # BLD AUTO: 0.76 X10(3) UL (ref 0.1–1)
MONOCYTES NFR BLD AUTO: 13.3 %
NEUTROPHILS # BLD AUTO: 3.9 X10 (3) UL (ref 1.5–7.7)
NEUTROPHILS # BLD AUTO: 3.9 X10(3) UL (ref 1.5–7.7)
NEUTROPHILS NFR BLD AUTO: 68.2 %
NITRITE UR QL STRIP.AUTO: NEGATIVE
NONHDLC SERPL-MCNC: 148 MG/DL (ref ?–130)
OSMOLALITY SERPL CALC.SUM OF ELEC: 287 MOSM/KG (ref 275–295)
PATIENT FASTING Y/N/NP: YES
PATIENT FASTING Y/N/NP: YES
PH UR: 6 [PH] (ref 5–8)
PLATELET # BLD AUTO: 280 10(3)UL (ref 150–450)
POTASSIUM SERPL-SCNC: 4.7 MMOL/L (ref 3.5–5.1)
PROT UR-MCNC: 30 MG/DL
RBC # BLD AUTO: 4.2 X10(6)UL
SODIUM SERPL-SCNC: 138 MMOL/L (ref 136–145)
SP GR UR STRIP: 1.02 (ref 1–1.03)
TOTAL IRON BINDING CAPACITY: 292 UG/DL (ref 240–450)
TRANSFERRIN SERPL-MCNC: 196 MG/DL (ref 200–360)
TRIGL SERPL-MCNC: 98 MG/DL (ref 30–149)
UROBILINOGEN UR STRIP-ACNC: 2
VLDLC SERPL CALC-MCNC: 18 MG/DL (ref 0–30)
WBC # BLD AUTO: 5.7 X10(3) UL (ref 4–11)
WBC #/AREA URNS AUTO: >50 /HPF

## 2021-08-11 PROCEDURE — 83540 ASSAY OF IRON: CPT

## 2021-08-11 PROCEDURE — 84466 ASSAY OF TRANSFERRIN: CPT

## 2021-08-11 PROCEDURE — 36415 COLL VENOUS BLD VENIPUNCTURE: CPT

## 2021-08-11 PROCEDURE — 84443 ASSAY THYROID STIM HORMONE: CPT | Performed by: INTERNAL MEDICINE

## 2021-08-11 PROCEDURE — 80061 LIPID PANEL: CPT

## 2021-08-11 PROCEDURE — 82728 ASSAY OF FERRITIN: CPT

## 2021-08-11 PROCEDURE — 81001 URINALYSIS AUTO W/SCOPE: CPT

## 2021-08-11 PROCEDURE — 87086 URINE CULTURE/COLONY COUNT: CPT

## 2021-08-11 PROCEDURE — 87186 SC STD MICRODIL/AGAR DIL: CPT

## 2021-08-11 PROCEDURE — 87077 CULTURE AEROBIC IDENTIFY: CPT

## 2021-08-11 PROCEDURE — 80053 COMPREHEN METABOLIC PANEL: CPT

## 2021-08-11 PROCEDURE — 85025 COMPLETE CBC W/AUTO DIFF WBC: CPT

## 2021-08-12 ENCOUNTER — TELEPHONE (OUTPATIENT)
Dept: GASTROENTEROLOGY | Facility: CLINIC | Age: 81
End: 2021-08-12

## 2021-08-12 NOTE — TELEPHONE ENCOUNTER
Left detailed voicemail(ok per ADAMARIS) for patient with below info. Instructed her to call back if she has any questions on these results.

## 2021-08-12 NOTE — TELEPHONE ENCOUNTER
----- Message from STEPHEN Alvares sent at 8/11/2021  1:30 PM CDT -----  Nursing: I attempted to contact the patient by phone however her mailbox was full and I was not able to leave a message.   Please try to contact her to let her know that her iron

## 2021-08-31 ENCOUNTER — LAB ENCOUNTER (OUTPATIENT)
Dept: LAB | Facility: HOSPITAL | Age: 81
End: 2021-08-31
Attending: INTERNAL MEDICINE
Payer: MEDICARE

## 2021-08-31 DIAGNOSIS — R53.83 FATIGUE, UNSPECIFIED TYPE: ICD-10-CM

## 2021-08-31 DIAGNOSIS — I10 ESSENTIAL HYPERTENSION, BENIGN: ICD-10-CM

## 2021-08-31 LAB
ANION GAP SERPL CALC-SCNC: 4 MMOL/L (ref 0–18)
BASOPHILS # BLD AUTO: 0.05 X10(3) UL (ref 0–0.2)
BASOPHILS NFR BLD AUTO: 0.8 %
BILIRUB UR QL: NEGATIVE
BUN BLD-MCNC: 19 MG/DL (ref 7–18)
BUN/CREAT SERPL: 27.5 (ref 10–20)
CALCIUM BLD-MCNC: 8.6 MG/DL (ref 8.5–10.1)
CHLORIDE SERPL-SCNC: 108 MMOL/L (ref 98–112)
CO2 SERPL-SCNC: 26 MMOL/L (ref 21–32)
COLOR UR: YELLOW
CREAT BLD-MCNC: 0.69 MG/DL
DEPRECATED RDW RBC AUTO: 54 FL (ref 35.1–46.3)
EOSINOPHIL # BLD AUTO: 0.09 X10(3) UL (ref 0–0.7)
EOSINOPHIL NFR BLD AUTO: 1.5 %
ERYTHROCYTE [DISTWIDTH] IN BLOOD BY AUTOMATED COUNT: 16.7 % (ref 11–15)
GLUCOSE BLD-MCNC: 89 MG/DL (ref 70–99)
GLUCOSE UR-MCNC: NEGATIVE MG/DL
HCT VFR BLD AUTO: 44.4 %
HGB BLD-MCNC: 14.1 G/DL
HGB UR QL STRIP.AUTO: NEGATIVE
IMM GRANULOCYTES # BLD AUTO: 0.02 X10(3) UL (ref 0–1)
IMM GRANULOCYTES NFR BLD: 0.3 %
KETONES UR-MCNC: NEGATIVE MG/DL
LYMPHOCYTES # BLD AUTO: 1.37 X10(3) UL (ref 1–4)
LYMPHOCYTES NFR BLD AUTO: 22.9 %
MCH RBC QN AUTO: 30.5 PG (ref 26–34)
MCHC RBC AUTO-ENTMCNC: 31.8 G/DL (ref 31–37)
MCV RBC AUTO: 96.1 FL
MONOCYTES # BLD AUTO: 0.71 X10(3) UL (ref 0.1–1)
MONOCYTES NFR BLD AUTO: 11.9 %
NEUTROPHILS # BLD AUTO: 3.74 X10 (3) UL (ref 1.5–7.7)
NEUTROPHILS # BLD AUTO: 3.74 X10(3) UL (ref 1.5–7.7)
NEUTROPHILS NFR BLD AUTO: 62.6 %
NITRITE UR QL STRIP.AUTO: NEGATIVE
OSMOLALITY SERPL CALC.SUM OF ELEC: 288 MOSM/KG (ref 275–295)
PATIENT FASTING Y/N/NP: NO
PH UR: 7 [PH] (ref 5–8)
PLATELET # BLD AUTO: 234 10(3)UL (ref 150–450)
POTASSIUM SERPL-SCNC: 4.6 MMOL/L (ref 3.5–5.1)
PROT UR-MCNC: 30 MG/DL
RBC # BLD AUTO: 4.62 X10(6)UL
SODIUM SERPL-SCNC: 138 MMOL/L (ref 136–145)
SP GR UR STRIP: 1.02 (ref 1–1.03)
UROBILINOGEN UR STRIP-ACNC: <2
WBC # BLD AUTO: 6 X10(3) UL (ref 4–11)

## 2021-08-31 PROCEDURE — 85025 COMPLETE CBC W/AUTO DIFF WBC: CPT

## 2021-08-31 PROCEDURE — 36415 COLL VENOUS BLD VENIPUNCTURE: CPT

## 2021-08-31 PROCEDURE — 87086 URINE CULTURE/COLONY COUNT: CPT

## 2021-08-31 PROCEDURE — 80048 BASIC METABOLIC PNL TOTAL CA: CPT

## 2021-08-31 PROCEDURE — 81001 URINALYSIS AUTO W/SCOPE: CPT

## 2021-09-20 NOTE — PROGRESS NOTES
HPI:    Patient ID: Amy Melendez is a 68year old female. HPI  Terral Homans returns in follow-up. She was last seen in March 2016. As per previous notes the patient was hospitalized in early 2015 with a profound iron deficiency anemia (hemoglobin 4.9). (six) hours as needed for Wheezing. Disp: 1 Inhaler Rfl: 5     Allergies:No Known Allergies   PHYSICAL EXAM:   Physical Exam   Constitutional: She is oriented to person, place, and time. She appears well-developed and well-nourished. No distress.    Looks y 0.0 - 1.0 K/UL 1.1 (H) 0.5 0.8   Eosinophils Absolute      0.0 - 0.7 K/UL 0.1 0.1 0.1   Basophils Absolute      0.0 - 0.2 K/UL 0.0 0.0 0.0   FERRITIN      11 - 307 ng/mL 9 (L) 13    Iron, Serum      28 - 170 mcg/dL 22 (L) 45             ASSESSMENT/PLAN: Trilobed Flap Text: The defect edges were debeveled with a #15 scalpel blade.  Given the location of the defect and the proximity to free margins a trilobed flap was deemed most appropriate.  Using a sterile surgical marker, an appropriate trilobed flap drawn around the defect.    The area thus outlined was incised deep to adipose tissue with a #15 scalpel blade.  The skin margins were undermined to an appropriate distance in all directions utilizing iris scissors.

## 2022-12-12 ENCOUNTER — HOSPITAL ENCOUNTER (EMERGENCY)
Facility: HOSPITAL | Age: 82
Discharge: HOME | End: 2022-12-12
Attending: EMERGENCY MEDICINE
Payer: MEDICARE

## 2022-12-12 ENCOUNTER — APPOINTMENT (OUTPATIENT)
Dept: GENERAL RADIOLOGY | Facility: HOSPITAL | Age: 82
End: 2022-12-12
Payer: MEDICARE

## 2022-12-12 VITALS
HEART RATE: 75 BPM | DIASTOLIC BLOOD PRESSURE: 58 MMHG | OXYGEN SATURATION: 94 % | RESPIRATION RATE: 20 BRPM | BODY MASS INDEX: 25.69 KG/M2 | TEMPERATURE: 98 F | SYSTOLIC BLOOD PRESSURE: 113 MMHG | WEIGHT: 145 LBS | HEIGHT: 63 IN

## 2022-12-12 DIAGNOSIS — J11.1 INFLUENZA: Primary | ICD-10-CM

## 2022-12-12 DIAGNOSIS — N30.01 ACUTE CYSTITIS WITH HEMATURIA: ICD-10-CM

## 2022-12-12 LAB
ANION GAP SERPL CALC-SCNC: 7 MMOL/L (ref 0–18)
BASOPHILS # BLD AUTO: 0.02 X10(3) UL (ref 0–0.2)
BASOPHILS NFR BLD AUTO: 0.3 %
BILIRUB UR QL: NEGATIVE
BUN BLD-MCNC: 19 MG/DL (ref 7–18)
BUN/CREAT SERPL: 24.7 (ref 10–20)
CALCIUM BLD-MCNC: 9.5 MG/DL (ref 8.5–10.1)
CHLORIDE SERPL-SCNC: 105 MMOL/L (ref 98–112)
CO2 SERPL-SCNC: 29 MMOL/L (ref 21–32)
COLOR UR: YELLOW
CREAT BLD-MCNC: 0.77 MG/DL
DEPRECATED RDW RBC AUTO: 48.7 FL (ref 35.1–46.3)
EOSINOPHIL # BLD AUTO: 0.02 X10(3) UL (ref 0–0.7)
EOSINOPHIL NFR BLD AUTO: 0.3 %
ERYTHROCYTE [DISTWIDTH] IN BLOOD BY AUTOMATED COUNT: 13.9 % (ref 11–15)
FLUAV + FLUBV RNA SPEC NAA+PROBE: NEGATIVE
FLUAV + FLUBV RNA SPEC NAA+PROBE: POSITIVE
GFR SERPLBLD BASED ON 1.73 SQ M-ARVRAT: 77 ML/MIN/1.73M2 (ref 60–?)
GLUCOSE BLD-MCNC: 99 MG/DL (ref 70–99)
GLUCOSE UR-MCNC: NEGATIVE MG/DL
HCT VFR BLD AUTO: 44.8 %
HGB BLD-MCNC: 14.1 G/DL
HGB UR QL STRIP.AUTO: NEGATIVE
HYALINE CASTS #/AREA URNS AUTO: PRESENT /LPF
IMM GRANULOCYTES # BLD AUTO: 0.02 X10(3) UL (ref 0–1)
IMM GRANULOCYTES NFR BLD: 0.3 %
KETONES UR-MCNC: 20 MG/DL
LYMPHOCYTES # BLD AUTO: 1.31 X10(3) UL (ref 1–4)
LYMPHOCYTES NFR BLD AUTO: 22.5 %
MCH RBC QN AUTO: 29.7 PG (ref 26–34)
MCHC RBC AUTO-ENTMCNC: 31.5 G/DL (ref 31–37)
MCV RBC AUTO: 94.5 FL
MONOCYTES # BLD AUTO: 0.74 X10(3) UL (ref 0.1–1)
MONOCYTES NFR BLD AUTO: 12.7 %
NEUTROPHILS # BLD AUTO: 3.72 X10 (3) UL (ref 1.5–7.7)
NEUTROPHILS # BLD AUTO: 3.72 X10(3) UL (ref 1.5–7.7)
NEUTROPHILS NFR BLD AUTO: 63.9 %
NITRITE UR QL STRIP.AUTO: POSITIVE
OSMOLALITY SERPL CALC.SUM OF ELEC: 294 MOSM/KG (ref 275–295)
PH UR: 5 [PH] (ref 5–8)
PLATELET # BLD AUTO: 255 10(3)UL (ref 150–450)
POTASSIUM SERPL-SCNC: 3.6 MMOL/L (ref 3.5–5.1)
PROT UR-MCNC: 30 MG/DL
RBC # BLD AUTO: 4.74 X10(6)UL
RSV RNA SPEC NAA+PROBE: NEGATIVE
SARS-COV-2 RNA RESP QL NAA+PROBE: NOT DETECTED
SODIUM SERPL-SCNC: 141 MMOL/L (ref 136–145)
SP GR UR STRIP: 1.03 (ref 1–1.03)
UROBILINOGEN UR STRIP-ACNC: <2
VIT C UR-MCNC: 40 MG/DL
WBC # BLD AUTO: 5.8 X10(3) UL (ref 4–11)
WBC #/AREA URNS AUTO: >50 /HPF

## 2022-12-12 PROCEDURE — 85025 COMPLETE CBC W/AUTO DIFF WBC: CPT | Performed by: EMERGENCY MEDICINE

## 2022-12-12 PROCEDURE — 87186 SC STD MICRODIL/AGAR DIL: CPT | Performed by: EMERGENCY MEDICINE

## 2022-12-12 PROCEDURE — 96360 HYDRATION IV INFUSION INIT: CPT

## 2022-12-12 PROCEDURE — 99284 EMERGENCY DEPT VISIT MOD MDM: CPT

## 2022-12-12 PROCEDURE — 80048 BASIC METABOLIC PNL TOTAL CA: CPT | Performed by: EMERGENCY MEDICINE

## 2022-12-12 PROCEDURE — 0241U SARS-COV-2/FLU A AND B/RSV BY PCR (GENEXPERT): CPT | Performed by: EMERGENCY MEDICINE

## 2022-12-12 PROCEDURE — 94640 AIRWAY INHALATION TREATMENT: CPT

## 2022-12-12 PROCEDURE — 87088 URINE BACTERIA CULTURE: CPT | Performed by: EMERGENCY MEDICINE

## 2022-12-12 PROCEDURE — 87086 URINE CULTURE/COLONY COUNT: CPT | Performed by: EMERGENCY MEDICINE

## 2022-12-12 PROCEDURE — 71045 X-RAY EXAM CHEST 1 VIEW: CPT | Performed by: EMERGENCY MEDICINE

## 2022-12-12 PROCEDURE — 81001 URINALYSIS AUTO W/SCOPE: CPT | Performed by: EMERGENCY MEDICINE

## 2022-12-12 PROCEDURE — 96361 HYDRATE IV INFUSION ADD-ON: CPT

## 2022-12-12 RX ORDER — CEPHALEXIN 500 MG/1
500 CAPSULE ORAL 2 TIMES DAILY
Qty: 6 CAPSULE | Refills: 0 | Status: SHIPPED | OUTPATIENT
Start: 2022-12-12 | End: 2022-12-15

## 2022-12-12 RX ORDER — ALBUTEROL SULFATE 90 UG/1
2 AEROSOL, METERED RESPIRATORY (INHALATION) EVERY 4 HOURS PRN
Qty: 1 EACH | Refills: 0 | Status: SHIPPED | OUTPATIENT
Start: 2022-12-12 | End: 2023-01-11

## 2022-12-12 RX ORDER — BENZONATATE 100 MG/1
100 CAPSULE ORAL 3 TIMES DAILY PRN
Qty: 30 CAPSULE | Refills: 0 | Status: SHIPPED | OUTPATIENT
Start: 2022-12-12 | End: 2023-01-11

## 2022-12-12 RX ORDER — PREDNISONE 20 MG/1
40 TABLET ORAL DAILY
Qty: 10 TABLET | Refills: 0 | Status: SHIPPED | OUTPATIENT
Start: 2022-12-12 | End: 2022-12-17

## 2022-12-12 RX ORDER — ALBUTEROL SULFATE 2.5 MG/3ML
2.5 SOLUTION RESPIRATORY (INHALATION) ONCE
Status: COMPLETED | OUTPATIENT
Start: 2022-12-12 | End: 2022-12-12

## 2022-12-12 NOTE — ED INITIAL ASSESSMENT (HPI)
Cough, fevers for over 2 weeks. Patient was prescribed augmentin for unknown issue per daughter. She states her mother has no energy, is very fatigued. Poor appetite. Feels body aches, fevers resolved.

## 2022-12-12 NOTE — ED QUICK NOTES
Patient safe to DC home per MD. Karen Lay to dress self. DC teaching done, instructions reviewed with patient including when and how to follow up with healthcare providers and when to seek emergency care. The patient verbalizes understanding. Peripheral IV discontinued. Patient ambulatory with steady gait to exit.

## 2022-12-14 ENCOUNTER — PATIENT OUTREACH (OUTPATIENT)
Dept: CASE MANAGEMENT | Age: 82
End: 2022-12-14

## 2022-12-14 RX ORDER — CIPROFLOXACIN 500 MG/1
500 TABLET, FILM COATED ORAL 2 TIMES DAILY
Qty: 14 TABLET | Refills: 0 | Status: SHIPPED | OUTPATIENT
Start: 2022-12-14 | End: 2022-12-21

## 2022-12-14 NOTE — PROGRESS NOTES
VM received; pt would like to know when she can go to her other apts (dc 12/12) - can transfer pt to her PCP to ask them questions  [address] [employer] [address]  [address] [address] [phone]      LVM for pt if assisted still needed call [phone]    Closing encounter

## 2022-12-14 NOTE — PROGRESS NOTES
VM received; pt would like to know when she can go to her other apts (dc 12/12) - can transfer pt to her PCP to ask them questions    Stuart Perez  [place] [address]  [address] [facility]  [phone]

## 2023-09-24 NOTE — PLAN OF CARE
"When patient arrived patient was irritable, refused to have an attendant.   Patient is now more calm but continues to have demanding, manipulative attitude, demands to talk to her boyfriend \" he is the only one that helps with my emotions.\"  Demanded to walk in the HW. ED policy was discussed with patient, patient was not happy but complied.  Pt able to contract to safety. 1:1 attendant at BS.  Bilateral lower extremities and L lower FA have superficial cuts. Wounds have been cleansed with NS. No s/sx of infections noted on the wounds  " Problem: Patient Centered Care  Goal: Patient preferences are identified and integrated in the patient's plan of care  Description: Interventions:  - What would you like us to know as we care for you?  Lives at home with  that has dementia and adul Progressing     Problem: GASTROINTESTINAL - ADULT  Goal: Minimal or absence of nausea and vomiting  Description: INTERVENTIONS:  - Maintain adequate hydration with IV or PO as ordered and tolerated  - Nasogastric tube to low intermittent suction as ordered as needed  - Evaluate fluid balance  Outcome: Progressing     Problem: HEMATOLOGIC - ADULT  Goal: Maintains hematologic stability  Description: INTERVENTIONS  - Assess for signs and symptoms of bleeding or hemorrhage  - Monitor labs and vital signs for almaz Monitor vital signs, rhythm, and trends  - Monitor for bleeding, hypotension and signs of decreased cardiac output  - Evaluate effectiveness of vasoactive medications to optimize hemodynamic stability  - Monitor arterial and/or venous puncture sites for bl 111

## 2024-08-22 DIAGNOSIS — M48.062 SPINAL STENOSIS OF LUMBAR REGION WITH NEUROGENIC CLAUDICATION: Primary | ICD-10-CM

## 2024-08-23 ASSESSMENT — ENCOUNTER SYMPTOMS
ALLEVIATING FACTORS: CHANGE IN POSITION
PAIN LOCATION: BACK
QUALITY: UNABLE TO SPECIFY
ALLEVIATING FACTORS: UNABLE TO STATE ANYTHING THAT HELPS REDUCE THE PAIN
QUALITY: PINS AND NEEDLES
QUALITY: STIFF
QUALITY: SHARP
SUBJECTIVE PAIN PROGRESSION: NO CHANGE
QUALITY: BURNING

## 2024-08-26 ENCOUNTER — APPOINTMENT (OUTPATIENT)
Dept: PHYSICAL MEDICINE AND REHAB | Age: 84
End: 2024-08-26

## 2024-08-28 ENCOUNTER — APPOINTMENT (OUTPATIENT)
Dept: PHYSICAL MEDICINE AND REHAB | Age: 84
End: 2024-08-28

## 2024-09-10 ENCOUNTER — HOSPITAL ENCOUNTER (OUTPATIENT)
Dept: PHYSICAL MEDICINE AND REHAB | Age: 84
Discharge: STILL ADMITTED OR ADMINISTRATIVE | End: 2024-09-10

## 2024-09-10 PROCEDURE — 97110 THERAPEUTIC EXERCISES: CPT

## 2024-09-10 PROCEDURE — 97162 PT EVAL MOD COMPLEX 30 MIN: CPT

## 2024-09-10 ASSESSMENT — ENCOUNTER SYMPTOMS
ALLEVIATING FACTORS: AVOIDING MOVEMENT IN INVOLVED AREA
PAIN: 1
QUALITY: ACHE
PAIN LOCATION: LOW BACK PAIN
SUBJECTIVE PAIN PROGRESSION: WORSENING
ALLEVIATING FACTORS: REST
QUALITY: DISCOMFORT
PAIN FREQUENCY: INTERMITTENT

## 2024-09-18 ENCOUNTER — APPOINTMENT (OUTPATIENT)
Dept: PHYSICAL MEDICINE AND REHAB | Age: 84
End: 2024-09-18

## 2024-09-19 ENCOUNTER — HOSPITAL ENCOUNTER (OUTPATIENT)
Dept: PHYSICAL MEDICINE AND REHAB | Age: 84
Discharge: STILL ADMITTED OR ADMINISTRATIVE | End: 2024-09-19

## 2024-09-19 PROCEDURE — 97110 THERAPEUTIC EXERCISES: CPT

## 2024-09-19 PROCEDURE — 97112 NEUROMUSCULAR REEDUCATION: CPT

## 2024-09-19 ASSESSMENT — ENCOUNTER SYMPTOMS: PAIN SEVERITY NOW: 6

## 2024-09-20 ENCOUNTER — APPOINTMENT (OUTPATIENT)
Dept: PHYSICAL MEDICINE AND REHAB | Age: 84
End: 2024-09-20

## 2024-09-20 PROCEDURE — 97110 THERAPEUTIC EXERCISES: CPT | Performed by: PHYSICAL THERAPY ASSISTANT

## 2024-09-20 PROCEDURE — 97112 NEUROMUSCULAR REEDUCATION: CPT | Performed by: PHYSICAL THERAPY ASSISTANT

## 2024-09-25 ENCOUNTER — APPOINTMENT (OUTPATIENT)
Dept: PHYSICAL MEDICINE AND REHAB | Age: 84
End: 2024-09-25

## 2024-09-26 ENCOUNTER — HOSPITAL ENCOUNTER (OUTPATIENT)
Dept: PHYSICAL MEDICINE AND REHAB | Age: 84
Discharge: STILL ADMITTED OR ADMINISTRATIVE | End: 2024-09-26

## 2024-09-26 PROCEDURE — 97112 NEUROMUSCULAR REEDUCATION: CPT

## 2024-09-26 PROCEDURE — 97110 THERAPEUTIC EXERCISES: CPT

## 2024-09-27 ENCOUNTER — APPOINTMENT (OUTPATIENT)
Dept: PHYSICAL MEDICINE AND REHAB | Age: 84
End: 2024-09-27

## 2024-09-27 PROCEDURE — 97112 NEUROMUSCULAR REEDUCATION: CPT | Performed by: PHYSICAL THERAPY ASSISTANT

## 2024-09-27 PROCEDURE — 97110 THERAPEUTIC EXERCISES: CPT | Performed by: PHYSICAL THERAPY ASSISTANT

## 2024-09-27 ASSESSMENT — ENCOUNTER SYMPTOMS: PAIN SEVERITY NOW: 4

## 2024-10-02 ENCOUNTER — HOSPITAL ENCOUNTER (OUTPATIENT)
Dept: PHYSICAL MEDICINE AND REHAB | Age: 84
Discharge: STILL ADMITTED OR ADMINISTRATIVE | End: 2024-10-02

## 2024-10-02 PROCEDURE — 97140 MANUAL THERAPY 1/> REGIONS: CPT

## 2024-10-02 PROCEDURE — 97110 THERAPEUTIC EXERCISES: CPT

## 2024-10-04 ENCOUNTER — APPOINTMENT (OUTPATIENT)
Dept: PHYSICAL MEDICINE AND REHAB | Age: 84
End: 2024-10-04

## 2024-10-09 ENCOUNTER — APPOINTMENT (OUTPATIENT)
Dept: PHYSICAL MEDICINE AND REHAB | Age: 84
End: 2024-10-09

## 2024-10-11 ENCOUNTER — HOSPITAL ENCOUNTER (OUTPATIENT)
Dept: PHYSICAL MEDICINE AND REHAB | Age: 84
Discharge: STILL ADMITTED OR ADMINISTRATIVE | End: 2024-10-11

## 2024-10-11 PROCEDURE — 97110 THERAPEUTIC EXERCISES: CPT

## 2024-10-11 ASSESSMENT — ENCOUNTER SYMPTOMS: PAIN SEVERITY NOW: 0

## 2024-10-16 ENCOUNTER — HOSPITAL ENCOUNTER (OUTPATIENT)
Dept: PHYSICAL MEDICINE AND REHAB | Age: 84
Discharge: STILL ADMITTED OR ADMINISTRATIVE | End: 2024-10-16

## 2024-10-16 PROCEDURE — 97140 MANUAL THERAPY 1/> REGIONS: CPT

## 2024-10-16 PROCEDURE — 97110 THERAPEUTIC EXERCISES: CPT

## 2024-10-16 ASSESSMENT — ENCOUNTER SYMPTOMS: PAIN SEVERITY NOW: 3

## 2024-10-18 ENCOUNTER — APPOINTMENT (OUTPATIENT)
Dept: PHYSICAL MEDICINE AND REHAB | Age: 84
End: 2024-10-18

## 2024-11-08 ENCOUNTER — TELEPHONE (OUTPATIENT)
Dept: PHYSICAL MEDICINE AND REHAB | Age: 84
End: 2024-11-08

## (undated) DEVICE — Device: Brand: CUSTOM PROCEDURE KIT

## (undated) DEVICE — CHLORAPREP 26ML APPLICATOR

## (undated) DEVICE — SUTURE MONOCRYL 3-0 Y936H

## (undated) DEVICE — BATTERY

## (undated) DEVICE — DRAPE SHEET LG

## (undated) DEVICE — SOL  .9 1000ML BTL

## (undated) DEVICE — BOWL CEMENT MIX QUICK-VAC

## (undated) DEVICE — POLAR CARE CUBE COOLING UNIT

## (undated) DEVICE — 35 ML SYRINGE REGULAR TIP: Brand: MONOJECT

## (undated) DEVICE — Device

## (undated) DEVICE — Device: Brand: STABLECUT®

## (undated) DEVICE — DRESSING AQUACEL AG 3.5 X 10

## (undated) DEVICE — LINE MNTR ADLT SET O2 INTMD

## (undated) DEVICE — 3 ML SYRINGE LUER-LOCK TIP: Brand: MONOJECT

## (undated) DEVICE — GAMMEX® PI HYBRID SIZE 6.5, STERILE POWDER-FREE SURGICAL GLOVE, POLYISOPRENE AND NEOPRENE BLEND: Brand: GAMMEX

## (undated) DEVICE — CONMED SCOPE SAVER BITE BLOCK, 20X27 MM: Brand: SCOPE SAVER

## (undated) DEVICE — COTTON UNDERCAST PADDING,REGULAR FINISH: Brand: WEBRIL

## (undated) DEVICE — MEDI-VAC NON-CONDUCTIVE SUCTION TUBING 6MM X 1.8M (6FT.) L: Brand: CARDINAL HEALTH

## (undated) DEVICE — 33MM MG 2.5 HEX HEAD PIN-DJ

## (undated) DEVICE — 6 ML SYRINGE LUER-LOCK TIP: Brand: MONOJECT

## (undated) DEVICE — GAUZE SPONGES,12 PLY: Brand: CURITY

## (undated) DEVICE — GAMMEX® PI HYBRID SIZE 7.5, STERILE POWDER-FREE SURGICAL GLOVE, POLYISOPRENE AND NEOPRENE BLEND: Brand: GAMMEX

## (undated) DEVICE — DRAPE SRG 70X60IN SPLT U IMPRV

## (undated) DEVICE — ZIMMER® STERILE DISPOSABLE TOURNIQUET CUFF WITH PLC, DUAL PORT, SINGLE BLADDER, 30 IN. (76 CM)

## (undated) DEVICE — SOL  .9 3000ML

## (undated) DEVICE — ANTIBACTERIAL UNDYED BRAIDED (POLYGLACTIN 910), SYNTHETIC ABSORBABLE SUTURE: Brand: COATED VICRYL

## (undated) DEVICE — DERMABOND LIQUID ADHESIVE

## (undated) DEVICE — T5 HOOD WITH PEEL AWAY FACE SHIELD

## (undated) DEVICE — 60 ML SYRINGE LUER-LOCK TIP: Brand: MONOJECT

## (undated) DEVICE — ANTIBACTERIAL VIOLET BRAIDED (POLYGLACTIN 910), SYNTHETIC ABSORBABLE SUTURE: Brand: COATED VICRYL

## (undated) DEVICE — 60 ML SYRINGE,TOOMEY TYPE: Brand: MONOJECT

## (undated) DEVICE — PAD THRP 16IN WRPON MU LNG STM

## (undated) DEVICE — SYRINGE MNJCT 35ML LF STRL LL

## (undated) DEVICE — CONTAINER SPEC STR 4OZ GRY LID

## (undated) DEVICE — GAMMEX® PI HYBRID SIZE 8, STERILE POWDER-FREE SURGICAL GLOVE, POLYISOPRENE AND NEOPRENE BLEND: Brand: GAMMEX

## (undated) DEVICE — Device: Brand: DEFENDO AIR/WATER/SUCTION AND BIOPSY VALVE

## (undated) DEVICE — TOTAL KNEE: Brand: MEDLINE INDUSTRIES, INC.

## (undated) DEVICE — 3M™ IOBAN™ 2 ANTIMICROBIAL INCISE DRAPE 6650EZ: Brand: IOBAN™ 2

## (undated) DEVICE — NEEDLE HPO 18GA 1.5IN ECLPS

## (undated) DEVICE — STANDARD HYPODERMIC NEEDLE,POLYPROPYLENE HUB: Brand: MONOJECT

## (undated) DEVICE — 50MM MG 2.5 HEX HEAD PIN-DJ

## (undated) DEVICE — DRESSING AQUACEL AG SP 3.5X10

## (undated) NOTE — LETTER
04/22/19        Nicki Astudillo Stimac  [address]      Dear Regi Pickett,    [address] [facility] records indicate that you have outstanding lab work and or testing that was ordered for you and has not yet been completed:    CBC with Differential with Plat

## (undated) NOTE — LETTER
[address], [place]  Authorization for Invasive Procedures  1.  I hereby authorize Timbo Walker, my physician and whomever may be designated as the doctor's assistant, to perform the following operation and/or procedure: potential risks that can occur: fever and allergic reactions, hemolytic reactions, transmission of disease such as hepatitis, AIDS, cytomegalovirus (CMV), and flluid overload.  In the event that I wish to have autologous transfusions of my own blood, or a d judgment of my physician.      Signature of Patient:  ________________________________________________ Date: _________Time: _________    Responsible person in case of minor or unconscious: _____________________________Relationship: ____________     Witness

## (undated) NOTE — Clinical Note
02/17/2017        Jose Ramon Sotomaoyr Stim  [address] [facility]      Dear Rosanna Palma,    [address] [facility] records indicate that you have outstanding lab work and or testing that was ordered for you and has not yet been completed: CBC blood test was due in [place]

## (undated) NOTE — LETTER
[facility]  Administration of Anesthesia  1. I, Nicolette Grove, or _________________________________ acting on her behalf, (Patient) (Dependent/Representative) request to receive anesthesia for my pending procedure/operation/treatment.   A bleeding, seizure, cardiac arrest and death. 7. AWARENESS: I understand that it is possible (but unlikely) to have explicit memory of events from the operating room while under general anesthesia.   8. ELECTROCONVULSIVE THERAPY PATIENTS: This consent serve below affirms that prior to the time of the procedure, I have explained to the patient and/or his/her guardian, the risks and benefits of undergoing anesthesia, as well as any reasonable alternatives.     ___________________________________________________